# Patient Record
Sex: FEMALE | Race: BLACK OR AFRICAN AMERICAN | Employment: UNEMPLOYED | URBAN - METROPOLITAN AREA
[De-identification: names, ages, dates, MRNs, and addresses within clinical notes are randomized per-mention and may not be internally consistent; named-entity substitution may affect disease eponyms.]

---

## 2019-04-18 ENCOUNTER — HOSPITAL ENCOUNTER (EMERGENCY)
Age: 29
Discharge: HOME | End: 2019-04-18
Attending: EMERGENCY MEDICINE
Payer: MEDICAID

## 2019-04-18 VITALS
HEIGHT: 67 IN | HEART RATE: 81 BPM | RESPIRATION RATE: 16 BRPM | SYSTOLIC BLOOD PRESSURE: 117 MMHG | TEMPERATURE: 97.9 F | BODY MASS INDEX: 30.61 KG/M2 | OXYGEN SATURATION: 100 % | WEIGHT: 195 LBS | DIASTOLIC BLOOD PRESSURE: 73 MMHG

## 2019-04-18 DIAGNOSIS — F32.A DEPRESSION, UNSPECIFIED DEPRESSION TYPE: Primary | ICD-10-CM

## 2019-04-18 LAB
-: ABNORMAL
ABSOLUTE EOS #: 0 K/UL (ref 0–0.4)
ABSOLUTE IMMATURE GRANULOCYTE: ABNORMAL K/UL (ref 0–0.3)
ABSOLUTE LYMPH #: 2 K/UL (ref 1–4.8)
ABSOLUTE MONO #: 1.1 K/UL (ref 0.1–1.2)
ALBUMIN SERPL-MCNC: 4.4 G/DL (ref 3.5–5.2)
ALBUMIN/GLOBULIN RATIO: 1.2 (ref 1–2.5)
ALP BLD-CCNC: 50 U/L (ref 35–104)
ALT SERPL-CCNC: 9 U/L (ref 5–33)
AMORPHOUS: ABNORMAL
AMPHETAMINE SCREEN URINE: NEGATIVE
ANION GAP SERPL CALCULATED.3IONS-SCNC: 15 MMOL/L (ref 9–17)
AST SERPL-CCNC: 19 U/L
BACTERIA: ABNORMAL
BARBITURATE SCREEN URINE: NEGATIVE
BASOPHILS # BLD: 0 % (ref 0–2)
BASOPHILS ABSOLUTE: 0 K/UL (ref 0–0.2)
BENZODIAZEPINE SCREEN, URINE: NEGATIVE
BILIRUB SERPL-MCNC: 0.6 MG/DL (ref 0.3–1.2)
BILIRUBIN URINE: ABNORMAL
BUN BLDV-MCNC: 15 MG/DL (ref 6–20)
BUN/CREAT BLD: ABNORMAL (ref 9–20)
BUPRENORPHINE URINE: ABNORMAL
CALCIUM SERPL-MCNC: 9.2 MG/DL (ref 8.6–10.4)
CANNABINOID SCREEN URINE: POSITIVE
CASTS UA: ABNORMAL /LPF
CHLORIDE BLD-SCNC: 104 MMOL/L (ref 98–107)
CO2: 17 MMOL/L (ref 20–31)
COCAINE METABOLITE, URINE: NEGATIVE
COLOR: YELLOW
COMMENT UA: ABNORMAL
CREAT SERPL-MCNC: 0.58 MG/DL (ref 0.5–0.9)
CRYSTALS, UA: ABNORMAL /HPF
DIFFERENTIAL TYPE: ABNORMAL
EOSINOPHILS RELATIVE PERCENT: 0 % (ref 1–4)
EPITHELIAL CELLS UA: ABNORMAL /HPF (ref 0–5)
GFR AFRICAN AMERICAN: >60 ML/MIN
GFR NON-AFRICAN AMERICAN: >60 ML/MIN
GFR SERPL CREATININE-BSD FRML MDRD: ABNORMAL ML/MIN/{1.73_M2}
GFR SERPL CREATININE-BSD FRML MDRD: ABNORMAL ML/MIN/{1.73_M2}
GLUCOSE BLD-MCNC: 89 MG/DL (ref 70–99)
GLUCOSE URINE: NEGATIVE
HCG(URINE) PREGNANCY TEST: NEGATIVE
HCT VFR BLD CALC: 38.5 % (ref 36–46)
HEMOGLOBIN: 12.7 G/DL (ref 12–16)
IMMATURE GRANULOCYTES: ABNORMAL %
KETONES, URINE: ABNORMAL
LEUKOCYTE ESTERASE, URINE: NEGATIVE
LYMPHOCYTES # BLD: 15 % (ref 24–44)
MCH RBC QN AUTO: 29.8 PG (ref 26–34)
MCHC RBC AUTO-ENTMCNC: 33.1 G/DL (ref 31–37)
MCV RBC AUTO: 89.9 FL (ref 80–100)
MDMA URINE: ABNORMAL
METHADONE SCREEN, URINE: NEGATIVE
METHAMPHETAMINE, URINE: ABNORMAL
MONOCYTES # BLD: 8 % (ref 2–11)
MUCUS: ABNORMAL
NITRITE, URINE: NEGATIVE
NRBC AUTOMATED: ABNORMAL PER 100 WBC
OPIATES, URINE: NEGATIVE
OTHER OBSERVATIONS UA: ABNORMAL
OXYCODONE SCREEN URINE: NEGATIVE
PDW BLD-RTO: 14.2 % (ref 12.5–15.4)
PH UA: 6 (ref 5–8)
PHENCYCLIDINE, URINE: NEGATIVE
PLATELET # BLD: 251 K/UL (ref 140–450)
PLATELET ESTIMATE: ABNORMAL
PMV BLD AUTO: 8.3 FL (ref 6–12)
POTASSIUM SERPL-SCNC: 4 MMOL/L (ref 3.7–5.3)
PROPOXYPHENE, URINE: ABNORMAL
PROTEIN UA: ABNORMAL
RBC # BLD: 4.28 M/UL (ref 4–5.2)
RBC # BLD: ABNORMAL 10*6/UL
RBC UA: ABNORMAL /HPF (ref 0–2)
RENAL EPITHELIAL, UA: ABNORMAL /HPF
SEG NEUTROPHILS: 77 % (ref 36–66)
SEGMENTED NEUTROPHILS ABSOLUTE COUNT: 10.4 K/UL (ref 1.8–7.7)
SODIUM BLD-SCNC: 136 MMOL/L (ref 135–144)
SPECIFIC GRAVITY UA: 1.03 (ref 1–1.03)
TEST INFORMATION: ABNORMAL
TOTAL PROTEIN: 8.1 G/DL (ref 6.4–8.3)
TRICHOMONAS: ABNORMAL
TRICYCLIC ANTIDEPRESSANTS, UR: ABNORMAL
TSH SERPL DL<=0.05 MIU/L-ACNC: 2.61 MIU/L (ref 0.3–5)
TURBIDITY: ABNORMAL
URINE HGB: NEGATIVE
UROBILINOGEN, URINE: NORMAL
WBC # BLD: 13.6 K/UL (ref 3.5–11)
WBC # BLD: ABNORMAL 10*3/UL
WBC UA: ABNORMAL /HPF (ref 0–5)
YEAST: ABNORMAL

## 2019-04-18 PROCEDURE — 80053 COMPREHEN METABOLIC PANEL: CPT

## 2019-04-18 PROCEDURE — 80307 DRUG TEST PRSMV CHEM ANLYZR: CPT

## 2019-04-18 PROCEDURE — 36415 COLL VENOUS BLD VENIPUNCTURE: CPT

## 2019-04-18 PROCEDURE — 85025 COMPLETE CBC W/AUTO DIFF WBC: CPT

## 2019-04-18 PROCEDURE — 81001 URINALYSIS AUTO W/SCOPE: CPT

## 2019-04-18 PROCEDURE — 99284 EMERGENCY DEPT VISIT MOD MDM: CPT

## 2019-04-18 PROCEDURE — 84703 CHORIONIC GONADOTROPIN ASSAY: CPT

## 2019-04-18 PROCEDURE — 84443 ASSAY THYROID STIM HORMONE: CPT

## 2019-04-18 SDOH — HEALTH STABILITY: MENTAL HEALTH: HOW OFTEN DO YOU HAVE A DRINK CONTAINING ALCOHOL?: NEVER

## 2019-04-18 ASSESSMENT — ENCOUNTER SYMPTOMS
SHORTNESS OF BREATH: 0
COUGH: 0
DIARRHEA: 0
VOMITING: 0
RHINORRHEA: 0
BACK PAIN: 0
ABDOMINAL PAIN: 0
NAUSEA: 0
EYE PAIN: 0
SORE THROAT: 0

## 2019-04-18 NOTE — ED NOTES
Pt placed in paper gown, all personal belongings/ jewelry collected, placed at nurses station. Pt in view of nurse for constant supervision. Portable computer, vital sign machine and Alaris removed from room.       Fuad President, JENNY  04/18/19 4890

## 2019-04-18 NOTE — ED NOTES
Pt sitting up in bad, asking to talk. Pt talking about her feelings of \"trying to find a purpose but nothing ever works out. \" States she feels she is \"living on a set and nothing is reality. \" Pt initally denies hallucinations, but then pt states she started to think about question and reflect, and states she was \"seeing angles, other creatures and aliens. \" Pt does reports she is close to her brother who lives in [place], states he has bipoloar, and talking with him helps her reflect about there own feelings. Pt tearful, emotional support provided. Verbalizes understanding that screener will come to ER to determine plan and provide resources.       Leighann Sutherland RN  04/18/19 9443

## 2019-04-18 NOTE — ED PROVIDER NOTES
[facility]  [address] [address]  Phone: [phone]  Fax: [phone]    eMERGENCY dEPARTMENT eNCOUnter          Pt Name: Rainer Acosta  MRN: [id number]  Macgfkevin 1990  Date of evaluation: 4/18/2019      CHIEF COMPLAINT       Chief Complaint   Patient presents with    Psychiatric Evaluation     reports new diagnosis of Schizoprenia in Dec, not on meds, reports paranoid and \"hyperspirituality\"        HISTORY OF PRESENT ILLNESS              Rainer Acosta is a 33 y.o. female who presents   requesting help for her psychiatric condition. States December of last year she was diagnosed with schizophrenia. She was started on medications and had outpatient therapy. This was all done in [place]. She has since moved here. Has not established any care. Has not taken the medications since December when she stopped them due to side effects. Had not tried any other medications. States the past few days she has had some thoughts of hurting herself but has not acted on any of those thoughts and has no intentions of doing so. Has also been \"hyper spiritual\" the past few days. States yesterday she smoked marijuana that made her paranoid but did not help any of her symptoms. Denies any other drugs or alcohol. Denies other medications. States she is not pregnant. Denies any physical pain. No homicidal thoughts. Denies any fevers or recent illness. She does report some paranoid thoughts yesterday. Denies other symptoms. REVIEW OF SYSTEMS         Review of Systems   Constitutional: Negative for chills, fatigue and fever. HENT: Negative for rhinorrhea and sore throat. Eyes: Negative for pain. Respiratory: Negative for cough and shortness of breath. Cardiovascular: Negative for chest pain. Gastrointestinal: Negative for abdominal pain, diarrhea, nausea and vomiting. Genitourinary: Negative for difficulty urinating. Musculoskeletal: Negative for back pain and neck pain. Skin: Negative for rash. Neurological: Negative for weakness and headaches. Psychiatric/Behavioral: Positive for decreased concentration, dysphoric mood and suicidal ideas. Negative for agitation, behavioral problems, confusion and self-injury. All other systems reviewed and are negative. PAST MEDICAL HISTORY    has a past medical history of Schizophrenia ([facility] [address] SURGICAL HISTORY      has no past surgical history on file. CURRENT MEDICATIONS       There are no discharge medications for this patient. ALLERGIES     has No Known Allergies. FAMILY HISTORY     has no family status information on file. family history is not on file. SOCIAL HISTORY      reports that she has never smoked. She has never used smokeless tobacco. She reports that she has current or past drug history. Drug: Marijuana. She reports that she does not drink alcohol. PHYSICAL EXAM     INITIAL VITALS:  height is 5' 7\" (1.702 m) and weight is 195 lb (88.5 kg). Her oral temperature is 97.9 °F (36.6 °C). Her blood pressure is 117/73 and her pulse is 81. Her respiration is 16 and oxygen saturation is 100%. Physical Exam   Constitutional: She appears well-developed and well-nourished. Non-toxic appearance. She does not appear ill. No distress. HENT:   Head: Normocephalic and atraumatic. Right Ear: External ear normal.   Left Ear: External ear normal.   Eyes: EOM and lids are normal.   Neck: No tracheal deviation present. Cardiovascular: Normal rate and regular rhythm. Pulmonary/Chest: Effort normal and breath sounds normal. No respiratory distress. Abdominal: Soft. There is no tenderness. Neurological: She is alert. GCS eye subscore is 4. GCS verbal subscore is 5. GCS motor subscore is 6. Skin: Skin is warm and dry. Psychiatric: She has a normal mood and affect. Her speech is normal and behavior is normal. Her mood appears not anxious. Thought content is paranoid. She expresses suicidal ideation. She expresses no homicidal ideation. She expresses no suicidal plans and no homicidal plans. Vitals reviewed. DIFFERENTIAL DIAGNOSIS/ MDM:     This time will be to check baseline labs and called the [facility] screen are for further evaluation. DIAGNOSTIC RESULTS     EKG: All EKG's are interpreted by the Emergency Department Physician who either signs or Co-signs this chart in the absence of a cardiologist.        RADIOLOGY:   I directly visualized the following  images and reviewed the radiologist interpretations:  No orders to display       No results found.     LABS:  Results for orders placed or performed during the hospital encounter of 04/18/19   CBC Auto Differential   Result Value Ref Range    WBC 13.6 (H) 3.5 - 11.0 k/uL    RBC 4.28 4.0 - 5.2 m/uL    Hemoglobin 12.7 12.0 - 16.0 g/dL    Hematocrit 38.5 36 - 46 %    MCV 89.9 80 - 100 fL    MCH 29.8 26 - 34 pg    MCHC 33.1 31 - 37 g/dL    RDW 14.2 12.5 - 15.4 %    Platelets 489 150 - 422 k/uL    MPV 8.3 6.0 - 12.0 fL    NRBC Automated NOT REPORTED per 100 WBC    Differential Type NOT REPORTED     Seg Neutrophils 77 (H) 36 - 66 %    Lymphocytes 15 (L) 24 - 44 %    Monocytes 8 2 - 11 %    Eosinophils % 0 (L) 1 - 4 %    Basophils 0 0 - 2 %    Immature Granulocytes NOT REPORTED 0 %    Segs Absolute 10.40 (H) 1.8 - 7.7 k/uL    Absolute Lymph # 2.00 1.0 - 4.8 k/uL    Absolute Mono # 1.10 0.1 - 1.2 k/uL    Absolute Eos # 0.00 0.0 - 0.4 k/uL    Basophils # 0.00 0.0 - 0.2 k/uL    Absolute Immature Granulocyte NOT REPORTED 0.00 - 0.30 k/uL    WBC Morphology NOT REPORTED     RBC Morphology NOT REPORTED     Platelet Estimate NOT REPORTED    Comprehensive Metabolic Panel   Result Value Ref Range    Glucose 89 70 - 99 mg/dL    BUN 15 6 - 20 mg/dL    CREATININE 0.58 0.50 - 0.90 mg/dL    Bun/Cre Ratio NOT REPORTED 9 - 20    Calcium 9.2 8.6 - 10.4 mg/dL    Sodium 136 135 - 144 mmol/L Potassium 4.0 3.7 - 5.3 mmol/L    Chloride 104 98 - 107 mmol/L    CO2 17 (L) 20 - 31 mmol/L    Anion Gap 15 9 - 17 mmol/L    Alkaline Phosphatase 50 35 - 104 U/L    ALT 9 5 - 33 U/L    AST 19 <32 U/L    Total Bilirubin 0.60 0.3 - 1.2 mg/dL    Total Protein 8.1 6.4 - 8.3 g/dL    Alb 4.4 3.5 - 5.2 g/dL    Albumin/Globulin Ratio 1.2 1.0 - 2.5    GFR Non-African American >60 >60 mL/min    GFR African American >60 >60 mL/min    GFR Comment          GFR Staging NOT REPORTED    Urine Drug Screen   Result Value Ref Range    Amphetamine Screen, Ur NEGATIVE NEGATIVE    Barbiturate Screen, Ur NEGATIVE NEGATIVE    Benzodiazepine Screen, Urine NEGATIVE NEGATIVE    Cocaine Metabolite, Urine NEGATIVE NEGATIVE    Methadone Screen, Urine NEGATIVE NEGATIVE    Opiates, Urine NEGATIVE NEGATIVE    Phencyclidine, Urine NEGATIVE NEGATIVE    Propoxyphene, Urine NOT REPORTED NEGATIVE    Cannabinoid Scrn, Ur POSITIVE (A) NEGATIVE    Oxycodone Screen, Ur NEGATIVE NEGATIVE    Methamphetamine, Urine NOT REPORTED NEGATIVE    Tricyclic Antidepressants, Urine NOT REPORTED NEGATIVE    MDMA, Urine NOT REPORTED NEGATIVE    Buprenorphine Urine NOT REPORTED NEGATIVE    Test Information       Assay provides medical screening only. The absence of expected drug(s) and/or metabolite(s) may indicate diluted or adulterated urine, limitations of testing or timing of collection.    Pregnancy, Urine   Result Value Ref Range    HCG(Urine) Pregnancy Test NEGATIVE NEGATIVE   TSH without Reflex   Result Value Ref Range    TSH 2.61 0.30 - 5.00 mIU/L   Urinalysis Reflex to Culture   Result Value Ref Range    Color, UA YELLOW YELLOW    Turbidity UA SLIGHTLY CLOUDY (A) CLEAR    Glucose, Ur NEGATIVE NEGATIVE    Bilirubin Urine NEGATIVE  Verified by ictotest. (A) NEGATIVE    Ketones, Urine LARGE (A) NEGATIVE    Specific Gravity, UA 1.028 1.005 - 1.030    Urine Hgb NEGATIVE NEGATIVE    pH, UA 6.0 5.0 - 8.0    Protein, UA TRACE (A) NEGATIVE    Urobilinogen, Urine Normal program.  Efforts were made to edit the dictations but occasionally words are mis-transcribed.)    Mejia Cedeno DO  Attending Emergency Physician        Mejia Cedeno DO  04/19/19 9808

## 2019-04-18 NOTE — ED NOTES
[facility] contacted for pt evaluation, states screener will be at facility within the hour.       Mary Barakat RN  04/18/19 9895

## 2019-04-18 NOTE — ED PROVIDER NOTES
ADDENDUM:        The patient was seen for Psychiatric Evaluation (reports new diagnosis of Schizoprenia in Dec, not on meds, reports paranoid and \"hyperspirituality\" )  . The patient's initial evaluation and plan have been discussed with the prior provider who initially evaluated the patient. Nursing Notes, Past Medical Hx, Past Surgical Hx, Social Hx, Allergies, and Family Hx were all reviewed.       Diagnostic Results     EKG         LABS:   Results for orders placed or performed during the hospital encounter of 04/18/19   CBC Auto Differential   Result Value Ref Range    WBC 13.6 (H) 3.5 - 11.0 k/uL    RBC 4.28 4.0 - 5.2 m/uL    Hemoglobin 12.7 12.0 - 16.0 g/dL    Hematocrit 38.5 36 - 46 %    MCV 89.9 80 - 100 fL    MCH 29.8 26 - 34 pg    MCHC 33.1 31 - 37 g/dL    RDW 14.2 12.5 - 15.4 %    Platelets 275 381 - 715 k/uL    MPV 8.3 6.0 - 12.0 fL    NRBC Automated NOT REPORTED per 100 WBC    Differential Type NOT REPORTED     Seg Neutrophils 77 (H) 36 - 66 %    Lymphocytes 15 (L) 24 - 44 %    Monocytes 8 2 - 11 %    Eosinophils % 0 (L) 1 - 4 %    Basophils 0 0 - 2 %    Immature Granulocytes NOT REPORTED 0 %    Segs Absolute 10.40 (H) 1.8 - 7.7 k/uL    Absolute Lymph # 2.00 1.0 - 4.8 k/uL    Absolute Mono # 1.10 0.1 - 1.2 k/uL    Absolute Eos # 0.00 0.0 - 0.4 k/uL    Basophils # 0.00 0.0 - 0.2 k/uL    Absolute Immature Granulocyte NOT REPORTED 0.00 - 0.30 k/uL    WBC Morphology NOT REPORTED     RBC Morphology NOT REPORTED     Platelet Estimate NOT REPORTED    Comprehensive Metabolic Panel   Result Value Ref Range    Glucose 89 70 - 99 mg/dL    BUN 15 6 - 20 mg/dL    CREATININE 0.58 0.50 - 0.90 mg/dL    Bun/Cre Ratio NOT REPORTED 9 - 20    Calcium 9.2 8.6 - 10.4 mg/dL    Sodium 136 135 - 144 mmol/L    Potassium 4.0 3.7 - 5.3 mmol/L    Chloride 104 98 - 107 mmol/L    CO2 17 (L) 20 - 31 mmol/L    Anion Gap 15 9 - 17 mmol/L    Alkaline Phosphatase 50 35 - 104 U/L    ALT 9 5 - 33 U/L    AST 19 <32 U/L    Total this note were completed with a voice recognition program.  Efforts were made to edit the dictations but occasionally words are mis-transcribed.)    Racquel Miller D.O.          Racqule Duty, DO  04/18/19 2228

## 2019-04-18 NOTE — ED NOTES
Cab arrives to transport pt to [facility] in [facility]. Pt cleared for discharge per MD. Pt discharge instructions explained, No Rx Given. Pt Verbalized understanding of all instructions and all patient questions answered to their satisfaction. Pt departs from ED in stable condition.         Alise Escobar RN  04/18/19 1603

## 2019-04-18 NOTE — ED NOTES
Pt to ER by self, ambulated to room, steady gait. Pt reports she desires a psych evaluation. Pt goes on to reports new diagnosis of schizophrenia in December, diagnosed in [place]. Pt reports she was set up with out patient therapy and meds prescribed, but pt moved to [address] shortly after, stopped meds after about one week, states she did not like \"how they made her feel. \" Pt has not had any psych treatment since December. Pt reports she is homeless, staying with family and friends, but states current house is \"dysfunctional environment,\" and she does not want to go back to family that she is living with now in [facility] [place] [facility]\"", she is hoping for contact information about shelters. Pt reports she has thoughts of harming herself \"mentally,\" states \"there is no purpose or point to live with these thoughts. \" Pt denies any plans or intent to act on thoughts. Pt denies wanting to harm others. Pt reports feeling paranoid, and \"hyperspirituality,\" that started yesterday. Pt states she has been walking since 8pm last night, waiting \"for a sign,\" to guide her. Pt denies pain, denies analgesics PTA, denies alcohol or illegal drug consumption, does admit to [facility] use tonight.  Pt calm, cooperative, respers even non labored, skin warm pink, no distress, here for eval.        Ana Neville RN  04/18/19 5446

## 2020-01-10 ENCOUNTER — HOSPITAL ENCOUNTER (INPATIENT)
Age: 30
LOS: 10 days | Discharge: OTHER INSTITUTION | DRG: 751 | End: 2020-01-20
Attending: PSYCHIATRY & NEUROLOGY | Admitting: PSYCHIATRY & NEUROLOGY
Payer: COMMERCIAL

## 2020-01-10 PROBLEM — F29 PSYCHOSIS (HCC): Status: ACTIVE | Noted: 2020-01-10

## 2020-01-10 PROCEDURE — 1240000000 HC EMOTIONAL WELLNESS R&B

## 2020-01-10 PROCEDURE — 6360000002 HC RX W HCPCS: Performed by: PSYCHIATRY & NEUROLOGY

## 2020-01-10 RX ORDER — TRAZODONE HYDROCHLORIDE 50 MG/1
50 TABLET ORAL NIGHTLY PRN
Status: DISCONTINUED | OUTPATIENT
Start: 2020-01-10 | End: 2020-01-20 | Stop reason: HOSPADM

## 2020-01-10 RX ORDER — LORAZEPAM 2 MG/ML
1 INJECTION INTRAMUSCULAR EVERY 8 HOURS PRN
Status: DISCONTINUED | OUTPATIENT
Start: 2020-01-10 | End: 2020-01-20 | Stop reason: HOSPADM

## 2020-01-10 RX ORDER — HALOPERIDOL 5 MG/ML
10 INJECTION INTRAMUSCULAR EVERY 8 HOURS PRN
Status: DISCONTINUED | OUTPATIENT
Start: 2020-01-10 | End: 2020-01-20 | Stop reason: HOSPADM

## 2020-01-10 RX ORDER — HYDROXYZINE HYDROCHLORIDE 25 MG/1
25 TABLET, FILM COATED ORAL 3 TIMES DAILY PRN
Status: DISCONTINUED | OUTPATIENT
Start: 2020-01-10 | End: 2020-01-20 | Stop reason: HOSPADM

## 2020-01-10 RX ORDER — MAGNESIUM HYDROXIDE/ALUMINUM HYDROXICE/SIMETHICONE 120; 1200; 1200 MG/30ML; MG/30ML; MG/30ML
30 SUSPENSION ORAL 3 TIMES DAILY PRN
Status: DISCONTINUED | OUTPATIENT
Start: 2020-01-10 | End: 2020-01-20 | Stop reason: HOSPADM

## 2020-01-10 RX ORDER — ACETAMINOPHEN 325 MG/1
650 TABLET ORAL EVERY 4 HOURS PRN
Status: DISCONTINUED | OUTPATIENT
Start: 2020-01-10 | End: 2020-01-20 | Stop reason: HOSPADM

## 2020-01-10 RX ORDER — DIPHENHYDRAMINE HYDROCHLORIDE 50 MG/ML
25 INJECTION INTRAMUSCULAR; INTRAVENOUS EVERY 8 HOURS PRN
Status: DISCONTINUED | OUTPATIENT
Start: 2020-01-10 | End: 2020-01-20 | Stop reason: HOSPADM

## 2020-01-10 RX ORDER — BENZTROPINE MESYLATE 1 MG/ML
2 INJECTION INTRAMUSCULAR; INTRAVENOUS DAILY PRN
Status: DISCONTINUED | OUTPATIENT
Start: 2020-01-10 | End: 2020-01-20 | Stop reason: HOSPADM

## 2020-01-10 RX ADMIN — LORAZEPAM 1 MG: 2 INJECTION INTRAMUSCULAR; INTRAVENOUS at 14:39

## 2020-01-10 RX ADMIN — HALOPERIDOL LACTATE 10 MG: 5 INJECTION, SOLUTION INTRAMUSCULAR at 14:38

## 2020-01-10 RX ADMIN — DIPHENHYDRAMINE HYDROCHLORIDE 25 MG: 50 INJECTION, SOLUTION INTRAMUSCULAR; INTRAVENOUS at 14:39

## 2020-01-10 ASSESSMENT — SLEEP AND FATIGUE QUESTIONNAIRES
RESTFUL SLEEP: YES
SLEEP PATTERN: DIFFICULTY FALLING ASLEEP
DO YOU HAVE DIFFICULTY SLEEPING: YES
DO YOU USE A SLEEP AID: NO
DIFFICULTY FALLING ASLEEP: YES
DIFFICULTY STAYING ASLEEP: YES
DIFFICULTY ARISING: NO
AVERAGE NUMBER OF SLEEP HOURS: 3

## 2020-01-10 ASSESSMENT — PAIN - FUNCTIONAL ASSESSMENT: PAIN_FUNCTIONAL_ASSESSMENT: 0-10

## 2020-01-10 ASSESSMENT — LIFESTYLE VARIABLES: HISTORY_ALCOHOL_USE: NO

## 2020-01-10 NOTE — GROUP NOTE
Group Therapy Note    Date: 1/10/2020    Group Start Time: 1330  Group End Time: 8136  Group Topic: Psychoeducation    JULES Greenfield, CTRS    Patient refused to attend Recreational Therapy Group at 1330 due to still going through admission process on unit. 1:1 not able to be offered due to patient having CIT on unit and receiving multiple PRNs.      Signature:  Giuliana Gongora
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
## 2020-01-10 NOTE — BH NOTE
BEHAVIORAL SERVICES: One - Hour In- Person Review  For Management of Violent or Self - Destructive Behavior  Medication assist - physical hold needed    Reason for Intervention: patient combative with staff and peers    Response to Intervention:  Patient continuing to yell  In room.     Medical Record reviewed and discussed precipitating events/behaviors with RN initiating Intervention:  {YES     Patient Medical Status:  Vital Signs: refused   Respiratory Status: WNL  Circulatory Status: WNL  Skin Integrity:intact    Orientation:  x4    Mood/Affect: labile    Speech: loud and pressured    Thought Content: delusions     Thought Processes: Flight of Ideas and Loose Associations    Rationale for continued use of intervention:  1 minute hold to administer medication    Rationale for discontinuing intervention: Patient is able to: maintain control    One Hour Review Evaluation Physician Notification:  yes

## 2020-01-10 NOTE — GROUP NOTE
Group Therapy Note    Date: 1/10/2020    Group Start Time: 9006  Group End Time: 7928  Group Topic: Psychoeducation    CARROLL YousifS    Patient was not able to attend Psychoeducation Group. Patient received PRN medications prior to group and was resting in room.      Signature:  Yulisa Ayers

## 2020-01-11 PROBLEM — F43.10 PTSD (POST-TRAUMATIC STRESS DISORDER): Status: ACTIVE | Noted: 2020-01-11

## 2020-01-11 PROCEDURE — 1240000000 HC EMOTIONAL WELLNESS R&B

## 2020-01-11 ASSESSMENT — ENCOUNTER SYMPTOMS
SHORTNESS OF BREATH: 0
TROUBLE SWALLOWING: 0
NAUSEA: 0
RHINORRHEA: 0
CONSTIPATION: 0
WHEEZING: 0
VOMITING: 0
DIARRHEA: 0
COUGH: 0
ABDOMINAL PAIN: 0

## 2020-01-11 ASSESSMENT — LIFESTYLE VARIABLES: HISTORY_ALCOHOL_USE: NO

## 2020-01-11 NOTE — GROUP NOTE
Group Therapy Note  Date: 1/11/2020   Group Start Time: 1330  Group End Time: 2711  Group Topic: Cognitive Skills Group-Name That Tune  Attendees: 6/16  Unit: [facility]  Goal: To increase patient's cognitive function by the use of music trivia by the end of group. Notes: Carissa Mercado was a pleasant participant in group.    Status After Intervention: Improved  Participation Level: Interactive, Active Listener   Participation Quality: Appropriate and Attentive  Speech: Normal  Thought Process/Content: Logical  Affective Functioning: Congruent  Mood: Euthymic  Level of consciousness: Alert, Oriented x4 and Attentive  Response to Learning: Able to verbalize current knowledge/experience, Able to verbalize/acknowledge new learning and Able to retain information  Endings: None Reported  Modes of Intervention: Socialization and Activity  Discipline Responsible: Psychoeducational Specialist  Signature: Bradley Francois

## 2020-01-11 NOTE — PLAN OF CARE
Toward Treatment Goals: reviewed group plans and strategies for care    Method:group therapy, medication compliance, individualized assessments and care planning    Outcome: needs reinforcement    PATIENT GOALS: to be discussed with patient within 72 hours    PLAN/TREATMENT RECOMMENDATIONS:     continue group therapy , medications compliance, goal setting, individualized assessments and care, continue to monitor pt on unit      SHORT-TERM GOALS:   Time frame for Short-Term Goals: 5-7 days    LONG-TERM GOALS:  Time frame for Long-Term Goals: 6 months  Members Present in Team Meeting: See Signature Sheet    Annie Snyder

## 2020-01-11 NOTE — GROUP NOTE
Group Therapy Note    Date: 1/11/2020    Group Start Time: 1600  Group End Time: 1640  Group Topic: Healthy Living/Wellness    JULES COX    Reina Travis LPN        Group Therapy Note    Attendees: 8/17         Patient's Goal:  Socialization education    Notes:      Status After Intervention:  Unchanged    Participation Level: Interactive    Participation Quality: Appropriate      Speech:  normal      Thought Process/Content: Logical      Affective Functioning: Congruent      Mood: stable      Level of consciousness:  Alert      Response to Learning: Able to verbalize current knowledge/experience      Endings: None Reported    Modes of Intervention: Education      Discipline Responsible: Licensed Practical Nurse      Signature:  Reina Travis LPN

## 2020-01-11 NOTE — GROUP NOTE
Group Therapy Note  Date: 1/11/2020   Group Start Time: 0900  Group End Time: 0930  Group Topic: [employer] & Trivia  Attendees: 6/18  Unit: [facility]  Goal: To provide a goal to improve his/her mental health that is obtainable by 8:00PM this evening. To increase cognitive function by the use of trivia. Notes: Chloe's goal for the day is to get some sleep.    Status After Intervention: Improved  Participation Level: Interactive, Active Listener   Participation Quality: Appropriate, Attentive, Sharing, Supportive  Speech: Normal  Thought Process/Content: Logical  Affective Functioning: Congruent  Mood: Euthymic  Level of consciousness: Alert, Oriented x4 and Attentive  Response to Learning: Able to verbalize current knowledge/experience, Able to verbalize/acknowledge new learning and Able to retain information  Endings: None Reported  Modes of Intervention: Education, Support, Socialization, Exploration, and Activity  Discipline Responsible: Psychoeducational Specialist  Signature: Kathy Tomas
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
## 2020-01-11 NOTE — H&P
HISTORY and [id number]       NAME:  Jesus Aguilar  MRN: [id number]   YOB: 1990   Date: 1/11/2020   Age: 30 y.o. Gender: female     COMPLAINT AND PRESENT HISTORY:      Jesus Aguilar is 30 y.o.,African American  female, admitted because of psychosis. [facility], she was reportedly brought by the women's shelter she was staying at due to [place]. \" Pt is flat and evasive with examiner. She did allow limited physical examination. Pt states she received a PRN injection yesterday and \"I did not approve that. \" Advised to discuss her medications and treatment during inpatient with her psychiatric practitioner, as writer does the H&Ps only, pt verbalized understanding. Pt reports \"I'm currently unhoused. \"  It is unclear if she is been compliant with medications. Patient denies suicidal or homicidal ideation. She denies auditory tactile or visual hallucinations. She reports poor sleep and appetite. She denies smoking cigarettes. Denies alcohol or drug abuse. She reports she is otherwise healthy other than history of schizophrenia. Poor insight. See psychiatric admission note for further psychiatric history. Per chart notes, pt came in with abdominal pain but she denies this currently. DIAGNOSTIC RESULTS   Labs: pending. PAST MEDICAL HISTORY     Past Medical History:   Diagnosis Date    Schizophrenia [facility])     reports recent diagnosis in Dec 2018, not on meds currently        SURGICAL HISTORY     No past surgical history on file.     FAMILY HISTORY       Family History   Family history unknown: Yes       SOCIAL HISTORY       Social History     Socioeconomic History    Marital status: Single     Spouse name: Not on file    Number of children: Not on file    Years of education: Not on file    Highest education level: Not on file   Occupational History    Not on file   Social Needs    Financial resource for agitation, decreased concentration, dysphoric mood and sleep disturbance. Negative for hallucinations and suicidal ideas (she denies). The patient is nervous/anxious. GENERAL PHYSICAL EXAM:     Vitals: /75   Pulse 109   Temp 98.6 °F (37 °C)   Resp 14   Ht 5' 6\" (1.676 m)   Wt 172 lb (78 kg)   BMI 27.76 kg/m²  Body mass index is 27.76 kg/m². Pt was examined with a nurse present in the room. GENERAL APPEARANCE:  She Mcmahon is 30 y.o.,  female, mildly obese, nourished, conscious, alert. Does not appear to be distress or pain at this time. SKIN:  Warm, dry, no cyanosis or jaundice. HEAD:  Normocephalic, atraumatic, no swelling or tenderness. EYES:  Wears glasses. Pupils equal, reactive to light, Conjunctiva is clear, EOMs intact donald. eyelids WNL. EARS:  No discharge, no marked hearing loss. NOSE:  No rhinorrhea, epistaxis or septal deformity. THROAT:  Uvula midline. No ulceration bleeding or discharge. NECK:  No stiffness, trachea central.    CHEST:  Symmetrical and equal on expansion. HEART:  Tachycardic rate and reg rhythm. S1 > S2, No audible murmurs or gallops. LUNGS:  Equal on expansion, normal breath sounds. ABDOMEN:  Soft on palpation. No localized tenderness. No guarding or rigidity. No palpable organomegaly. LYMPHATICS:  ROSHAN. LOCOMOTOR, BACK AND SPINE:  No tenderness. EXTREMITIES:  Symmetrical, no pedal edema. No calf tenderness. Moves all extremities with no difficulty. NEUROLOGIC:  The patient is conscious, alert, oriented, Gait WNL. No apparent focal sensory deficits. No motor deficits, muscle strength equal Donald. No facial droop, tongue protrudes centrally, no slurring of the speech. Cranial nerves 3-12 reveal no deficits, taste and smell not assessed. PROVISIONAL DIAGNOSES:      Active Problems:    Psychosis ([address]  Resolved Problems:    * No resolved hospital problems.

## 2020-01-11 NOTE — BH NOTE
Patient refused vitals, giving urine sample, blood work, and interview at this time, stating \"not until my advocate gets hear. \"
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
## 2020-01-12 PROCEDURE — 1240000000 HC EMOTIONAL WELLNESS R&B

## 2020-01-12 NOTE — PLAN OF CARE
Problem: Depressive Behavior With or Without Suicide Precautions:  Goal: Able to verbalize acceptance of life and situations over which he or she has no control  Description  Able to verbalize acceptance of life and situations over which he or she has no control  1/11/2020 2017 by [address]  Outcome: Ongoing  1/11/2020 1201 by Amita Obrien LPN  Outcome: Ongoing     Problem: Altered Mood, Deterioration in Function:  Goal: Able to verbalize reality based thinking  Description  Able to verbalize reality based thinking  1/11/2020 2017 by [address]  Outcome: Ongoing  1/11/2020 1201 by Amita Obrien LPN  Outcome: Ongoing   Patient has been seclusive to her room much of the shift. She relates that she has some anxiety because of her \"life situation\" but is vague and nonrelating of details.  She didn't attend group but has been cooperative with interventions

## 2020-01-12 NOTE — PLAN OF CARE
Problem: Depressive Behavior With or Without Suicide Precautions:  Goal: Able to verbalize acceptance of life and situations over which he or she has no control  Description  Able to verbalize acceptance of life and situations over which he or she has no control  Outcome: Ongoing     Problem: Altered Mood, Deterioration in Function:  Goal: Able to verbalize reality based thinking  Description  Able to verbalize reality based thinking  Outcome: Ongoing   Patient denies suicidal and homicidal ideations and depression and verbalizes if thoughts occur she will seek help from staff. Patient reports sleeping and eating well. Patient performed grooming and ADLs. Patient isolative to room, out for select groups and meals. When out for meals patient is social with select peers. Patient is able to verbalize acceptance of life which she has no control. Patient is focused on belongings but understands that she can not have belonging until discharge. Patient is cooperative and pleasant upon approach. Patient is behavioral controlled. Patient safety maintained and 15 minute checks continues.

## 2020-01-12 NOTE — GROUP NOTE
Group Therapy Note  Date: 1/12/2020  Group Start Time: 0900  Group End Time: 2326  Group Topic: Community Meeting & Trivia  Attendees: 10/18   Unit: [facility] Unit A  Goal: To provide a goal to improve his/her mental health that is obtainable by 8:00PM this evening. To increase cognitive function by the use of trivia.   Notes: Chloe's goal for the day is to write/journal.  Status After Intervention: Improved  Participation Level: Interactive, Active Listener   Participation Quality: Appropriate, Attentive, Sharing, Supportive  Speech: Normal  Thought Process/Content: Logical  Affective Functioning: Congruent  Mood: Euthymic  Level of consciousness: Alert, Oriented x4 and Attentive  Response to Learning: Able to verbalize current knowledge/experience, Able to verbalize/acknowledge new learning and Able to retain information  Endings: None Reported  Modes of Intervention: Education, Support, Socialization, Exploration, and Activity  Discipline Responsible: Psychoeducational Specialist  Signature: Laury Ramírez, [address]
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
## 2020-01-12 NOTE — GROUP NOTE
Group Therapy Note    Date: 1/12/2020    Group Start Time: 1100  Group End Time: 9778  Group Topic: Healthy Living/Wellness    JULES Elizabeth RN        Group Therapy Note    Attendees: 11/18         Patient's Goal:  To learn about cognitive behavioral therapy    Notes:  See below    Status After Intervention:  Improved    Participation Level:  Active Listener    Participation Quality: Attentive      Speech:  normal      Thought Process/Content: Logical      Affective Functioning: Congruent      Mood: stable      Level of consciousness:  Oriented x4      Response to Learning: Able to verbalize/acknowledge new learning      Endings: None Reported    Modes of Intervention: Education      Discipline Responsible: Registered Nurse      Signature:  Laureano Elizabeth RN

## 2020-01-12 NOTE — GROUP NOTE
Group Therapy Note    Date: 1/12/2020    Group Start Time: 1000  Group End Time: 3869  Group Topic: Psychoeducation    JULES COX    JUAN MANUEL Conroy        Group Therapy Note    Attendees: 7/18         Patient's Goal:  Pt will demonstrate increased interpersonal interaction and a clear understanding on multiple types of coping skills relating to the here-and-now therapeutic practice. Notes:  Pt actively listened and engaged during group discussion. Status After Intervention:  Improved    Participation Level:  Active Listener and Interactive    Participation Quality: Appropriate, Attentive, Sharing and Supportive      Speech:  normal      Thought Process/Content: Logical  Linear      Affective Functioning: Congruent      Mood: euthymic      Level of consciousness:  Alert, Oriented x4 and Attentive      Response to Learning: Able to verbalize current knowledge/experience, Able to verbalize/acknowledge new learning, Able to retain information, Capable of insight, Able to change behavior and Progressing to goal      Endings: None Reported    Modes of Intervention: Education, Support, Socialization, Clarifying, Problem-solving, Limit-setting and Reality-testing      Discipline Responsible: /Counselor      Signature:  JUAN MANUEL Conroy

## 2020-01-13 LAB
AMPHETAMINE SCREEN URINE: NEGATIVE
BARBITURATE SCREEN URINE: NEGATIVE
BENZODIAZEPINE SCREEN, URINE: NEGATIVE
BILIRUBIN URINE: NEGATIVE
BUPRENORPHINE URINE: NORMAL
CANNABINOID SCREEN URINE: NEGATIVE
COCAINE METABOLITE, URINE: NEGATIVE
COLOR: YELLOW
COMMENT UA: NORMAL
GLUCOSE URINE: NEGATIVE
KETONES, URINE: NEGATIVE
LEUKOCYTE ESTERASE, URINE: NEGATIVE
MDMA URINE: NORMAL
METHADONE SCREEN, URINE: NEGATIVE
METHAMPHETAMINE, URINE: NORMAL
NITRITE, URINE: NEGATIVE
OPIATES, URINE: NEGATIVE
OXYCODONE SCREEN URINE: NEGATIVE
PH UA: 6 (ref 5–8)
PHENCYCLIDINE, URINE: NEGATIVE
PROPOXYPHENE, URINE: NORMAL
PROTEIN UA: NEGATIVE
SPECIFIC GRAVITY UA: 1.03 (ref 1–1.03)
TEST INFORMATION: NORMAL
TRICYCLIC ANTIDEPRESSANTS, UR: NORMAL
TURBIDITY: CLEAR
URINE HGB: NEGATIVE
UROBILINOGEN, URINE: NORMAL

## 2020-01-13 PROCEDURE — 6370000000 HC RX 637 (ALT 250 FOR IP): Performed by: PSYCHIATRY & NEUROLOGY

## 2020-01-13 PROCEDURE — 1240000000 HC EMOTIONAL WELLNESS R&B

## 2020-01-13 PROCEDURE — 81003 URINALYSIS AUTO W/O SCOPE: CPT

## 2020-01-13 PROCEDURE — 80307 DRUG TEST PRSMV CHEM ANLYZR: CPT

## 2020-01-13 RX ADMIN — LURASIDONE HYDROCHLORIDE 20 MG: 40 TABLET, FILM COATED ORAL at 13:34

## 2020-01-13 NOTE — GROUP NOTE
Group Therapy Note    Date: 1/13/2020    Group Start Time: 1000  Group End Time: 0987  Group Topic: Psychotherapy    KELLY [facility] JUAN MANUEL Recinos        Group Therapy Note    Attendees: 4/9         Patient's Goal:  To focus on the here and now with mental health stability. Verbalize acceptance of situations they cannot control. Notes:  Pt stated she was not aware of her reason for being admitted to the [facility]. Expressed readiness to be discharged. Status After Intervention:  Unchanged    Participation Level:  Active Listener and Interactive    Participation Quality: Appropriate, Attentive and Sharing      Speech:  normal      Thought Process/Content: Linear      Affective Functioning: Congruent      Mood: anxious      Level of consciousness:  Alert, Oriented x4 and Attentive      Response to Learning: Progressing to goal      Endings: None Reported    Modes of Intervention: Education, Support, Socialization and Exploration      Discipline Responsible: /Counselor      Signature:  JUAN MANUEL Whitt

## 2020-01-13 NOTE — GROUP NOTE
Group Therapy Note    Date: 1/13/2020    Group Start Time: 1600  Group End Time: 1640  Group Topic: Music Therapy    [facility] [address] [place] [address], RN        Group Therapy Note    Attendees: 8 out of 18         Patient's Goal:  Improvement in mood through music    Notes:  Pt was attentive and appropriate in group    Status After Intervention:  Improved    Participation Level:  Active Listener and Interactive    Participation Quality: Appropriate and Attentive      Speech:  normal      Thought Process/Content: Linear      Affective Functioning: Congruent      Mood: anxious and depressed      Level of consciousness:  Alert, Oriented x4 and Attentive      Response to Learning: Able to verbalize current knowledge/experience      Endings: None Reported    Modes of Intervention: Socialization and Exploration      Discipline Responsible: Registered Nurse      Signature:  Jimbo Peace RN

## 2020-01-13 NOTE — PROGRESS NOTES
PROGRESS NOTE  Chart has been reviewed and the patient was interviewed at the bedside. Patient was a cooperative and pleasant. However, she is still psychotic and refusing to take medications. The patient reported irritability and agitation. She agreed to start on Latuda 20 mg p.o. daily to be titrated as tolerated. The patient reported the problem in falling and staying asleep. The staff reported that the patient had episodes of her control problem and needed to be redirected. Continue to monitoring for symptoms of depression, psychosis and to adjust her medications as needed. MENTAL STATUS EXAMINATION:    /82   Pulse 96   Temp 97.9 °F (36.6 °C) (Oral)   Resp 16   Ht 5' 6\" (1.676 m)   Wt 172 lb (78 kg)   BMI 27.76 kg/m²     MOOD-is dysphoric   Affect-is depressed  Patient feels helpless hopeless and worthless  Psychomotor activity : decreased. APPEARANCE:  Personal hygiene is poor and patient is neglecting his appearance. Patient is somewhat unkempt  PSYCHOSIS:  Denies auditory or visual hallucinations. Denies paranoid delusions. ORIENTATION:  Oriented to time place and person. Recent and remote memory is grossly intact. Intelligence appears to be average  CONCENTRATION:  poor. SPEECH : goal directed , but slow . DIAGNOSIS:    Principal Problem:    Psychosis ([facility] [address] [facility]  Active Problems:    PTSD (post-traumatic stress disorder)  Resolved Problems:    * No resolved hospital problems.  *      LAB:    Recent Results (from the past 72 hour(s))   Urinalysis Reflex to Culture    Collection Time: 01/13/20  1:44 AM   Result Value Ref Range    Color, UA YELLOW YELLOW    Turbidity UA CLEAR CLEAR    Glucose, Ur NEGATIVE NEGATIVE    Bilirubin Urine NEGATIVE NEGATIVE    Ketones, Urine NEGATIVE

## 2020-01-13 NOTE — CARE COORDINATION
[facility] for [address] received call back form [address] MichelEdilberto Russell [phone], who confirmed pt cannot return to shelter upon [facility] discharge. Vannessa Russell was going to call her supervisor to see if there was a possibility that their staff drop the pts items off at the [facility]. SW provided contact information for call back. SHAYAN spoke with ALEX Daniel who stated pt has a CPST needs assessment scheduled for 1/15/20 and has not been open for CM services yet.

## 2020-01-13 NOTE — PLAN OF CARE
Problem: Restraint Use - Nonviolent/Non-Self-Destructive Behavior:  Goal: Absence of restraint indications  Description  Absence of restraint indications, physical hold    1/13/2020 0021 by Maddy Cadena RN  Outcome: Ongoing   Patient has been free of restraint indicators this shift. Will continue to monitor. Problem: Altered Mood, Manic Behavior:  Goal: Able to sleep  Description  Able to sleep  1/13/2020 0021 by Maddy Cadena RN  Outcome: Ongoing   Patient was able to sleep this shift. Will continue to monitor. Problem: Depressive Behavior With or Without Suicide Precautions:  Goal: Able to verbalize acceptance of life and situations over which he or she has no control  Description  Able to verbalize acceptance of life and situations over which he or she has no control  1/13/2020 0021 by Maddy Cadena RN  Outcome: Ongoing    Problem: Altered Mood, Deterioration in Function:  Goal: Able to verbalize reality based thinking  Description  Able to verbalize reality based thinking  1/13/2020 0021 by Maddy Cadena RN  Outcome: Ongoing    Patient reports she is ready to go home and is fine. Patient remains isolative in room and aloof of peers the entire shift. Patient denies suicidal ideation, homicidal ideation, visual hallucinations, and auditory hallucinations. Patient denies depression and anxiety. Patient is accepting of nourishment from staff. Remains behavioral control. Q15 minute checks maintained.

## 2020-01-13 NOTE — CARE COORDINATION
KDOY for [facility] called Ramila to verify if pt can return to shelter, left contact information for manager to call  back.

## 2020-01-14 LAB
DIRECT EXAM: NORMAL
Lab: NORMAL
SPECIMEN DESCRIPTION: NORMAL

## 2020-01-14 PROCEDURE — 1240000000 HC EMOTIONAL WELLNESS R&B

## 2020-01-14 PROCEDURE — 6370000000 HC RX 637 (ALT 250 FOR IP): Performed by: PSYCHIATRY & NEUROLOGY

## 2020-01-14 PROCEDURE — 87804 INFLUENZA ASSAY W/OPTIC: CPT

## 2020-01-14 PROCEDURE — 99222 1ST HOSP IP/OBS MODERATE 55: CPT | Performed by: INTERNAL MEDICINE

## 2020-01-14 PROCEDURE — 6370000000 HC RX 637 (ALT 250 FOR IP): Performed by: INTERNAL MEDICINE

## 2020-01-14 RX ORDER — BENZONATATE 100 MG/1
100 CAPSULE ORAL 3 TIMES DAILY PRN
Status: DISCONTINUED | OUTPATIENT
Start: 2020-01-14 | End: 2020-01-20 | Stop reason: HOSPADM

## 2020-01-14 RX ORDER — GABAPENTIN 100 MG/1
100 CAPSULE ORAL 3 TIMES DAILY
Status: DISCONTINUED | OUTPATIENT
Start: 2020-01-14 | End: 2020-01-14

## 2020-01-14 RX ORDER — LEVOFLOXACIN 500 MG/1
500 TABLET, FILM COATED ORAL DAILY
Status: DISPENSED | OUTPATIENT
Start: 2020-01-14 | End: 2020-01-19

## 2020-01-14 RX ADMIN — HYDROXYZINE HYDROCHLORIDE 25 MG: 25 TABLET, FILM COATED ORAL at 23:17

## 2020-01-14 RX ADMIN — LEVOFLOXACIN 500 MG: 500 TABLET, FILM COATED ORAL at 21:37

## 2020-01-14 RX ADMIN — TRAZODONE HYDROCHLORIDE 50 MG: 50 TABLET ORAL at 20:40

## 2020-01-14 RX ADMIN — LURASIDONE HYDROCHLORIDE 20 MG: 40 TABLET, FILM COATED ORAL at 08:08

## 2020-01-14 RX ADMIN — ACETAMINOPHEN 650 MG: 325 TABLET, FILM COATED ORAL at 09:10

## 2020-01-14 ASSESSMENT — PAIN SCALES - GENERAL
PAINLEVEL_OUTOF10: 3
PAINLEVEL_OUTOF10: 0
PAINLEVEL_OUTOF10: 0

## 2020-01-14 NOTE — BH NOTE
levaquin still not available writer shift started at 3pm reporting nurse states pharmacy was called for medication  This [address] x1 for medication oncoming 7 pm shift notified medication is a daily dose and was not given d/t not being available

## 2020-01-14 NOTE — GROUP NOTE
Group Therapy Note    Date: 1/14/2020    Group Start Time: 1100  Group End Time: 1130  Group Topic: Recreational    STCZ FARA Harrington, CTRS    Group Therapy Note    Attendees: 6    Patient's Goal:  Increase social interaction    Notes:  Pt attended group and participated in activity. Pt interacted with staff and peers. Pt was supportive of peers in group. Status After Intervention:  Improved    Participation Level:  Active Listener and Interactive    Participation Quality: Appropriate, Attentive, Sharing and Supportive    Speech:  normal    Thought Process/Content: Logical    Affective Functioning: Congruent    Mood: euthymic    Level of consciousness:  Alert, Oriented x4 and Attentive    Response to Learning: Able to verbalize current knowledge/experience, Able to verbalize/acknowledge new learning, Able to retain information, Capable of insight and Progressing to goal    Endings: None Reported    Modes of Intervention: Education, Socialization, Exploration, Problem-solving, Activity, Limit-setting and Reality-testing    Discipline Responsible: Psychoeducational Specialist    Signature:  Clint Harrington, [address]

## 2020-01-14 NOTE — GROUP NOTE
Group Therapy Note    Date: 1/14/2020    Group Start Time: 1330  Group End Time: 6528  Group Topic: Psychoeducation    JULES Deleon, CTRS    Patient refused to attend Recreational Therapy Group at 1330 after encouragement from staff. 1:1 talk time offered.     Signature:  Jose David Yadav

## 2020-01-14 NOTE — PLAN OF CARE
Problem: Altered Mood, Manic Behavior:  Goal: Able to sleep  Description  Able to sleep  1/13/2020 2308 by Jonathan Trejo, RN  Outcome: Ongoing   Pt rests in her room quietly this shift  Problem: Depressive Behavior With or Without Suicide Precautions:  Goal: Able to verbalize acceptance of life and situations over which he or she has no control  Description  Able to verbalize acceptance of life and situations over which he or she has no control  1/13/2020 2308 by Jonathan Trejo, RN  Outcome: Ongoing   Pt is seclusive to her room aloof of staff and peers. She does not attend group or eat at snack.

## 2020-01-14 NOTE — PROGRESS NOTES
Urine NEGATIVE NEGATIVE    Ketones, Urine NEGATIVE NEGATIVE    Specific Gravity, UA 1.028 1.000 - 1.030    Urine Hgb NEGATIVE NEGATIVE    pH, UA 6.0 5.0 - 8.0    Protein, UA NEGATIVE NEGATIVE    Urobilinogen, Urine Normal Normal    Nitrite, Urine NEGATIVE NEGATIVE    Leukocyte Esterase, Urine NEGATIVE NEGATIVE    Urinalysis Comments       Microscopic exam not performed based on chemical results unless requested in original order. Urine Drug Screen    Collection Time: 01/13/20  1:45 AM   Result Value Ref Range    Amphetamine Screen, Ur NEGATIVE NEGATIVE    Barbiturate Screen, Ur NEGATIVE NEGATIVE    Benzodiazepine Screen, Urine NEGATIVE NEGATIVE    Cocaine Metabolite, Urine NEGATIVE NEGATIVE    Methadone Screen, Urine NEGATIVE NEGATIVE    Opiates, Urine NEGATIVE NEGATIVE    Phencyclidine, Urine NEGATIVE NEGATIVE    Propoxyphene, Urine NOT REPORTED NEGATIVE    Cannabinoid Scrn, Ur NEGATIVE NEGATIVE    Oxycodone Screen, Ur NEGATIVE NEGATIVE    Methamphetamine, Urine NOT REPORTED NEGATIVE    Tricyclic Antidepressants, Urine NOT REPORTED NEGATIVE    MDMA, Urine NOT REPORTED NEGATIVE    Buprenorphine Urine NOT REPORTED NEGATIVE    Test Information       Assay provides medical screening only. The absence of expected drug(s) and/or metabolite(s) may indicate diluted or adulterated urine, limitations of testing or timing of collection. RAPID INFLUENZA A/B ANTIGENS    Collection Time: 01/14/20  1:45 PM   Result Value Ref Range    Specimen Description . NASOPHARYNGEAL SWAB     Special Requests NOT REPORTED     Direct Exam       PRESUMPTIVE NEGATIVE for Influenza A + B antigens. PCR testing to confirm this result is available upon request.  Specimen will be saved in the laboratory for 7 days. Please call [phone] if PCR testing is indicated.            TREATMENT PLAN:     [START ON 1/15/2020] lurasidone  20 mg Oral Daily    levofloxacin  500 mg Oral Daily     acetaminophen, aluminum &

## 2020-01-14 NOTE — PROGRESS NOTES
Pt did not participate In Wellness at 1600 . Patient stated they were not interested in attending. Staff explained benefits of participation and offered encouragement to attend. Patient was offered 1:1 time which they refused.

## 2020-01-14 NOTE — BH NOTE
Writer spoke with Dr Carisa Hughes at . Dr Carisa Hughes notified of pt's temperature of 100.0, tachycardia with a rate of 110-158 and pt complaint of chest congestion and a cough.  Order for a flu swab obtained

## 2020-01-15 ENCOUNTER — APPOINTMENT (OUTPATIENT)
Dept: GENERAL RADIOLOGY | Age: 30
DRG: 751 | End: 2020-01-15
Attending: PSYCHIATRY & NEUROLOGY
Payer: COMMERCIAL

## 2020-01-15 LAB
ABSOLUTE EOS #: 0.04 K/UL (ref 0–0.4)
ABSOLUTE IMMATURE GRANULOCYTE: ABNORMAL K/UL (ref 0–0.3)
ABSOLUTE LYMPH #: 1.62 K/UL (ref 1–4.8)
ABSOLUTE MONO #: 0.67 K/UL (ref 0.1–1.3)
BASOPHILS # BLD: 0 % (ref 0–2)
BASOPHILS ABSOLUTE: 0 K/UL (ref 0–0.2)
DIFFERENTIAL TYPE: ABNORMAL
EOSINOPHILS RELATIVE PERCENT: 1 % (ref 0–4)
HCT VFR BLD CALC: 38.4 % (ref 36–46)
HEMOGLOBIN: 12.8 G/DL (ref 12–16)
IMMATURE GRANULOCYTES: ABNORMAL %
LYMPHOCYTES # BLD: 44 % (ref 24–44)
MCH RBC QN AUTO: 30.1 PG (ref 26–34)
MCHC RBC AUTO-ENTMCNC: 33.3 G/DL (ref 31–37)
MCV RBC AUTO: 90.4 FL (ref 80–100)
MONOCYTES # BLD: 18 % (ref 1–7)
MORPHOLOGY: NORMAL
NRBC AUTOMATED: ABNORMAL PER 100 WBC
PDW BLD-RTO: 13.4 % (ref 11.5–14.9)
PLATELET # BLD: 194 K/UL (ref 150–450)
PLATELET ESTIMATE: ABNORMAL
PMV BLD AUTO: 8.1 FL (ref 6–12)
RBC # BLD: 4.25 M/UL (ref 4–5.2)
RBC # BLD: ABNORMAL 10*6/UL
SEG NEUTROPHILS: 37 % (ref 36–66)
SEGMENTED NEUTROPHILS ABSOLUTE COUNT: 1.37 K/UL (ref 1.3–9.1)
WBC # BLD: 3.7 K/UL (ref 3.5–11)
WBC # BLD: ABNORMAL 10*3/UL

## 2020-01-15 PROCEDURE — 6370000000 HC RX 637 (ALT 250 FOR IP): Performed by: INTERNAL MEDICINE

## 2020-01-15 PROCEDURE — 36415 COLL VENOUS BLD VENIPUNCTURE: CPT

## 2020-01-15 PROCEDURE — 6370000000 HC RX 637 (ALT 250 FOR IP): Performed by: PSYCHIATRY & NEUROLOGY

## 2020-01-15 PROCEDURE — 1240000000 HC EMOTIONAL WELLNESS R&B

## 2020-01-15 PROCEDURE — 85025 COMPLETE CBC W/AUTO DIFF WBC: CPT

## 2020-01-15 PROCEDURE — 71046 X-RAY EXAM CHEST 2 VIEWS: CPT

## 2020-01-15 RX ORDER — OXCARBAZEPINE 300 MG/1
300 TABLET, FILM COATED ORAL 2 TIMES DAILY
Status: DISCONTINUED | OUTPATIENT
Start: 2020-01-15 | End: 2020-01-20 | Stop reason: HOSPADM

## 2020-01-15 RX ADMIN — LEVOFLOXACIN 500 MG: 500 TABLET, FILM COATED ORAL at 11:55

## 2020-01-15 RX ADMIN — BENZONATATE 100 MG: 100 CAPSULE ORAL at 19:59

## 2020-01-15 RX ADMIN — OXCARBAZEPINE 300 MG: 300 TABLET, FILM COATED ORAL at 11:55

## 2020-01-15 RX ADMIN — LURASIDONE HYDROCHLORIDE 20 MG: 40 TABLET, FILM COATED ORAL at 08:21

## 2020-01-15 RX ADMIN — BENZONATATE 100 MG: 100 CAPSULE ORAL at 11:55

## 2020-01-15 RX ADMIN — OXCARBAZEPINE 300 MG: 300 TABLET, FILM COATED ORAL at 22:15

## 2020-01-15 NOTE — GROUP NOTE
Group Therapy Note    Date: 1/15/2020    Group Start Time: 8996  Group End Time: 3727  Group Topic: Recreational    [address]    Patient refused to attend Recreational Therapy Group at [phone] after encouragement from staff. 1:1 talk time offered.     Signature:  Mert Banegas

## 2020-01-15 NOTE — GROUP NOTE
Group Therapy Note    Date: 1/15/2020    Group Start Time: 1000  Group End Time: 1114  Group Topic: Psychotherapy    JUAN MANUEL Fleming        Group Therapy Note    Attendees: 3/7         Patient's Goal:  To focus on the here and now with mental health stability. Verbalize acceptance of situations they cannot control. Status After Intervention:  Unchanged    Participation Level:  Active Listener and Interactive    Participation Quality: Appropriate, Attentive, Sharing and Supportive      Speech:  normal      Thought Process/Content: Linear      Affective Functioning: Congruent      Mood: euthymic      Level of consciousness:  Alert, Oriented x4 and Attentive      Response to Learning: Progressing to goal      Endings: None Reported    Modes of Intervention: Education, Support, Socialization and Exploration      Discipline Responsible: /Counselor      Signature:  JUAN MANUEL Joaquin

## 2020-01-15 NOTE — PROGRESS NOTES
Pharmacy Med Education Group Note    Date: 1/15/20  Start Time: 36  End Time: 2888    Number Participants in Group:  11    Goal:  Patient will demonstrate an understanding of the medications intended purpose and possible adverse effects  Topic: [place] for Pharmacy Med Ed Group    Discipline Responsible:     OT  AT  [facility].  RT     X Other       Participation Level:     None  Minimal      X Active Listener    X Interactive    Monopolizing         Participation Quality:    X Appropriate  Inappropriate     X       Attentive        Intrusive          Sharing        Resistant          Supportive        Lethargic       Affective:     X Congruent  Incongruent  Blunted  Flat    Constricted  Anxious  Elated  Angry    Labile  Depressed  Other         Cognitive:    X Alert  Oriented PPTP     Concentration   X G  F  P   Attention Span   X G  F  P   Short-Term Memory   X G  F  P   Long-Term Memory  G  F  P   ProblemSolving/  Decision Making  G  F  P   Ability to Process  Information   X G  F  P      Contributing Factors             Delusional             Hallucinating             Flight of Ideas             Other:       Modes of Intervention:    X Education   X Support  Exploration    Clarifying  Problem Solving  Confrontation    Socialization  Limit Setting  Reality Testing    Activity  Movement  Media    Other:            Response to Learning:    X Able to verbalize current knowledge/experience    Able to verbalize/acknowledge new learning    Able to retain information    Capable of insight    Able to change behavior    Progressing to goal    Other:        Comments:     Margie Fothergill, PharmD, BCPS  1/15/2020 5:22 PM

## 2020-01-15 NOTE — PLAN OF CARE
Problem: Pain:  Goal: Pain level will decrease  Description  Pain level will decrease  Outcome: Ongoing   Pt is satisfied with pain management    Problem: Altered Mood, Deterioration in Function:  Goal: Able to verbalize reality based thinking  Description  Able to verbalize reality based thinking  1/14/2020 2220 by Donald Barbour RN  Outcome: Ongoing   Pt is out of her room for needs only. Interactions are brief but thoughts are reality based. She accepts all medication.

## 2020-01-15 NOTE — GROUP NOTE
Group Therapy Note    Date: 1/14/2020    Group Start Time: 2040  Group End Time: 2110  Group Topic: Wrap-Up / Relaxation     ST[facility] A    [address], RN        Group Therapy Note    Attendees: 12           Signature:  [address], RN

## 2020-01-15 NOTE — BH NOTE
Post Restraint and Seclusion Patient Debriefing     Did debriefing occur? Yes     If No, why not? []? Patient refused  []? Patient is unavailable for debriefing due to:  []? Patient debriefing not completed due to clinical contraindication of:     What events led to the seclusion/restraint incident? Patients being focused on belongings         Did being restrained or in seclusion help you regain control of your behavior? YES     Did you feel safe while you were in restraint or seclusion:       [x]? Very Safe  []? Safe  []? Somewhat Safe  []? Not Safe      Did you have the chance to gain control of your behavior before you were secluded or restrained? YES  If Yes, how:     [x]? Offered Medication  [x]? Talked with Charge Nurse  []? Given Time Out       []? Offered alternatives to restraint/seclusion      During the restraint or seclusion process were you offered medicine to help you gain control? YES        Were your physical and emotional needs met, and your privacy rights addressed while you were in restraints/seclusion? YES        How can we assist you in remaining restraint or seclusion free in the future? Talking with staff         Is there anything else you would like to share regarding this restraint/seclusion episode? No      Patient consented to family or significant other to participate in debriefing No     Patient's guardian participated in debriefing (when applicable) No     Patient's guardian unable to participate in debriefing (when applicable) NA     Staff: Were modifications to the treatment plan completed?  YES

## 2020-01-15 NOTE — PROGRESS NOTES
NEGATIVE NEGATIVE    Ketones, Urine NEGATIVE NEGATIVE    Specific Gravity, UA 1.028 1.000 - 1.030    Urine Hgb NEGATIVE NEGATIVE    pH, UA 6.0 5.0 - 8.0    Protein, UA NEGATIVE NEGATIVE    Urobilinogen, Urine Normal Normal    Nitrite, Urine NEGATIVE NEGATIVE    Leukocyte Esterase, Urine NEGATIVE NEGATIVE    Urinalysis Comments       Microscopic exam not performed based on chemical results unless requested in original order. Urine Drug Screen    Collection Time: 01/13/20  1:45 AM   Result Value Ref Range    Amphetamine Screen, Ur NEGATIVE NEGATIVE    Barbiturate Screen, Ur NEGATIVE NEGATIVE    Benzodiazepine Screen, Urine NEGATIVE NEGATIVE    Cocaine Metabolite, Urine NEGATIVE NEGATIVE    Methadone Screen, Urine NEGATIVE NEGATIVE    Opiates, Urine NEGATIVE NEGATIVE    Phencyclidine, Urine NEGATIVE NEGATIVE    Propoxyphene, Urine NOT REPORTED NEGATIVE    Cannabinoid Scrn, Ur NEGATIVE NEGATIVE    Oxycodone Screen, Ur NEGATIVE NEGATIVE    Methamphetamine, Urine NOT REPORTED NEGATIVE    Tricyclic Antidepressants, Urine NOT REPORTED NEGATIVE    MDMA, Urine NOT REPORTED NEGATIVE    Buprenorphine Urine NOT REPORTED NEGATIVE    Test Information       Assay provides medical screening only. The absence of expected drug(s) and/or metabolite(s) may indicate diluted or adulterated urine, limitations of testing or timing of collection. RAPID INFLUENZA A/B ANTIGENS    Collection Time: 01/14/20  1:45 PM   Result Value Ref Range    Specimen Description . NASOPHARYNGEAL SWAB     Special Requests NOT REPORTED     Direct Exam       PRESUMPTIVE NEGATIVE for Influenza A + B antigens. PCR testing to confirm this result is available upon request.  Specimen will be saved in the laboratory for 7 days. Please call [phone] if PCR testing is indicated.    CBC Auto Differential    Collection Time: 01/15/20  6:57 AM   Result Value Ref Range    WBC 3.7 3.5 - 11.0 k/uL    RBC 4.25 4.0 - 5.2 m/uL    Hemoglobin 12.8 12.0 - 16.0 g/dL    Hematocrit 38.4 36 - 46 %    MCV 90.4 80 - 100 fL    MCH 30.1 26 - 34 pg    MCHC 33.3 31 - 37 g/dL    RDW 13.4 11.5 - 14.9 %    Platelets 410 827 - 521 k/uL    MPV 8.1 6.0 - 12.0 fL    NRBC Automated NOT REPORTED per 100 WBC    Differential Type NOT REPORTED     Immature Granulocytes NOT REPORTED 0 %    Absolute Immature Granulocyte NOT REPORTED 0.00 - 0.30 k/uL    WBC Morphology NOT REPORTED     RBC Morphology NOT REPORTED     Platelet Estimate NOT REPORTED     Seg Neutrophils 37 36 - 66 %    Lymphocytes 44 24 - 44 %    Monocytes 18 (H) 1 - 7 %    Eosinophils % 1 0 - 4 %    Basophils 0 0 - 2 %    Segs Absolute 1.37 1.3 - 9.1 k/uL    Absolute Lymph # 1.62 1.0 - 4.8 k/uL    Absolute Mono # 0.67 0.1 - 1.3 k/uL    Absolute Eos # 0.04 0.0 - 0.4 k/uL    Basophils Absolute 0.00 0.0 - 0.2 k/uL    Morphology Normal            TREATMENT PLAN:     [START ON 1/16/2020] lurasidone  40 mg Oral Daily    OXcarbazepine  300 mg Oral BID    levofloxacin  500 mg Oral Daily     benzonatate, acetaminophen, aluminum & magnesium hydroxide-simethicone, benztropine mesylate, traZODone, magnesium hydroxide, hydrOXYzine, haloperidol lactate **AND** LORazepam **AND** diphenhydrAMINE    Chart was reviewed and the patient has been interviewed  Continue same medications as prescribed above and increase Latuda 40 mg p.o. daily and to start Trileptal 300 mg p.o. twice daily  Patient was discussed with the  and the treatment team.   Provided Supportive and insight-oriented psychotherapy psychotherapy  Recommended involvement in Unit milieu  Provided empathic listening, validation and support  Patient acknowledged and mutually decided to continue with current treatment plan  Discharge planning was discussed with the patient.      Duong Patrick MD        This note was created with the assistance of a speech-recognition program.  Although the intention is to generate a document that actually reflects the content of the visit, no guarantees can be provided that every mistake has been identified and corrected by editing.

## 2020-01-15 NOTE — CONSULTS
[facility]    CONSULTATION / HISTORY AND PHYSICAL EXAMINATION            Date:   1/14/2020  Patient name:  Jesus Aguilar  Date of admission:  1/10/2020 12:29 PM  MRN:   [id number]  Account:  [de-identified]  YOB: 1990  PCP:    No primary care provider on file. Room:   [address] [place] [address]  Code Status:    Full Code    Physician Requesting Consult: Yasmine Shah MD    Reason for Consult: Fever and cough    Chief Complaint:     No chief complaint on file. Fever and cough    History Obtained From:     Patient medical records and nursing staff    History of Present Illness:     64-year-old lady complains of 2-day history of fever cough denies any chest pain no leg pain minimal dyspnea    Past Medical History:     Past Medical History:   Diagnosis Date    [facility]     reports recent diagnosis in Dec 2018, not on meds currently         Past Surgical History:     History reviewed. No pertinent surgical history. Medications Prior to Admission:     Prior to Admission medications    Not on File        Allergies:     Patient has no known allergies. Social History:     Tobacco:    reports that she has never smoked. She has never used smokeless tobacco.  Alcohol:      reports no history of alcohol use. Drug Use:  reports current drug use. Drug: Marijuana. Family History:     Family History   Family history unknown: Yes       Review of Systems:     Positive and Negative as described in HPI. CONSTITUTIONAL: Positive for fever chills, sweats, fatigue, weight loss  HEENT:  negative for vision, hearing changes, runny nose, throat pain  RESPIRATORY: Positive for cough minimal dyspnea and fever cARDIOVASCULAR:  negative for chest pain, palpitations.   GASTROINTESTINAL:  negative for nausea, vomiting, diarrhea, constipation, change in bowel habits, abdominal pain   GENITOURINARY:  negative for difficulty of urination, burning with urination, frequency   INTEGUMENT:  negative for rash, skin lesions, easy bruising   HEMATOLOGIC/LYMPHATIC:  negative for swelling/edema   ALLERGIC/IMMUNOLOGIC:  negative for urticaria , itching  ENDOCRINE:  negative increase in drinking, increase in urination, hot or cold intolerance  MUSCULOSKELETAL:  negative joint pains, muscle aches, swelling of joints  NEUROLOGICAL:  negative for headaches, dizziness, lightheadedness, numbness, pain, tingling extremities      Physical Exam:     /84   Pulse 90   Temp 98.3 °F (36.8 °C) (Oral)   Resp 16   Ht 5' 6\" (1.676 m)   Wt 172 lb (78 kg)   SpO2 100%   BMI 27.76 kg/m²   Temp (24hrs), Av.9 °F (37.2 °C), Min:98.1 °F (36.7 °C), Max:100 °F (37.8 °C)    No results for input(s): POCGLU in the last 72 hours. No intake or output data in the 24 hours ending 20 0604    General Appearance:  alert, well appearing, and in no acute distress  Mental status: oriented to person, place, and time with normal affect  Head:  normocephalic, atraumatic. Eye: no icterus, redness, pupils equal and reactive, extraocular eye movements intact, conjunctiva clear  Ear: normal external ear, no discharge, hearing intact  Nose:  no drainage noted  Mouth: mucous membranes moist  Neck: supple, no carotid bruits, thyroid not palpable  Lungs: Good air entry bilaterally no wheezing no rales no rhonchi  Cardiovascular: normal rate, regular rhythm, no murmur, gallop, rub.   Abdomen: Soft, nontender, nondistended, normal bowel sounds, no hepatomegaly or splenomegaly  Neurologic: There are no new focal motor or sensory deficits, normal muscle tone and bulk, no abnormal sensation, normal speech, cranial nerves II through XII grossly intact  Skin: No gross lesions, rashes, bruising or bleeding on exposed skin area  Extremities:  peripheral pulses palpable, no pedal edema or calf pain with palpation      Investigations:      Laboratory Testing:  Recent Results (from the past 24 hour(s))   RAPID INFLUENZA A/B ANTIGENS    Collection Time: 01/14/20  1:45 PM   Result Value Ref Range    Specimen Description . NASOPHARYNGEAL SWAB     Special Requests NOT REPORTED     Direct Exam       PRESUMPTIVE NEGATIVE for Influenza A + B antigens. PCR testing to confirm this result is available upon request.  Specimen will be saved in the laboratory for 7 days. Please call [phone] if PCR testing is indicated. Imaging/[facility]:      Assessment :      Primary Problem  Psychosis [facility])    Active Hospital Problems    Diagnosis Date Noted    PTSD (post-traumatic stress disorder) [F43.10] 01/11/2020    Psychosis ([facility] [F29] 01/10/2020       Plan:   Fever cough minimal dyspnea rule out influenza chest x-ray 2 views CBC and BMP  1. Consultations:   IP CONSULT TO HISTORY AND PHYSICAL  IP CONSULT TO INTERNAL MEDICINE      Abeba Lopez MD  1/14/2020  10:57 PM    Copy sent to Dr. Jeimy Rodriguez primary care provider on file. Please note that this chart was generated using voice recognition Dragon dictation software. Although every effort was made to ensure the accuracy of this automated transcription, some errors in transcription may have occurred.

## 2020-01-15 NOTE — BH NOTE
Post Restraint and Seclusion Patient Debriefing    Did debriefing occur? Yes    If No, why not? [] Patient refused  [] Patient is unavailable for debriefing due to:  [] Patient debriefing not completed due to clinical contraindication of:    What events led to the seclusion/restraint incident? Patients being focused on belongings       Did being restrained or in seclusion help you regain control of your behavior? YES    Did you feel safe while you were in restraint or seclusion:      [x] Very Safe  [] Safe  [] Somewhat Safe  [] Not Safe     Did you have the chance to gain control of your behavior before you were secluded or restrained? YES  If Yes, how:    [x] Offered Medication  [x] Talked with Charge Nurse  [] Given Time Out      [] Offered alternatives to restraint/seclusion     During the restraint or seclusion process were you offered medicine to help you gain control? YES      Were your physical and emotional needs met, and your privacy rights addressed while you were in restraints/seclusion? YES      How can we assist you in remaining restraint or seclusion free in the future? Talking with staff       Is there anything else you would like to share regarding this restraint/seclusion episode? No     Patient consented to family or significant other to participate in debriefing No    Patient's guardian participated in debriefing (when applicable) No    Patient's guardian unable to participate in debriefing (when applicable) NA    Staff: Were modifications to the treatment plan completed?  YES

## 2020-01-16 LAB
ABSOLUTE EOS #: 0.05 K/UL (ref 0–0.4)
ABSOLUTE IMMATURE GRANULOCYTE: ABNORMAL K/UL (ref 0–0.3)
ABSOLUTE LYMPH #: 2.16 K/UL (ref 1–4.8)
ABSOLUTE MONO #: 0.6 K/UL (ref 0.1–1.3)
BASOPHILS # BLD: 0 % (ref 0–2)
BASOPHILS ABSOLUTE: 0 K/UL (ref 0–0.2)
DIFFERENTIAL TYPE: ABNORMAL
EOSINOPHILS RELATIVE PERCENT: 1 % (ref 0–4)
HCT VFR BLD CALC: 37.4 % (ref 36–46)
HEMOGLOBIN: 12.6 G/DL (ref 12–16)
IMMATURE GRANULOCYTES: ABNORMAL %
LYMPHOCYTES # BLD: 47 % (ref 24–44)
MCH RBC QN AUTO: 30.4 PG (ref 26–34)
MCHC RBC AUTO-ENTMCNC: 33.6 G/DL (ref 31–37)
MCV RBC AUTO: 90.4 FL (ref 80–100)
MONOCYTES # BLD: 13 % (ref 1–7)
MORPHOLOGY: ABNORMAL
NRBC AUTOMATED: ABNORMAL PER 100 WBC
PDW BLD-RTO: 13.5 % (ref 11.5–14.9)
PLATELET # BLD: 198 K/UL (ref 150–450)
PLATELET ESTIMATE: ABNORMAL
PMV BLD AUTO: 8.7 FL (ref 6–12)
RBC # BLD: 4.14 M/UL (ref 4–5.2)
RBC # BLD: ABNORMAL 10*6/UL
SEG NEUTROPHILS: 39 % (ref 36–66)
SEGMENTED NEUTROPHILS ABSOLUTE COUNT: 1.79 K/UL (ref 1.3–9.1)
WBC # BLD: 4.6 K/UL (ref 3.5–11)
WBC # BLD: ABNORMAL 10*3/UL

## 2020-01-16 PROCEDURE — 1240000000 HC EMOTIONAL WELLNESS R&B

## 2020-01-16 PROCEDURE — 36415 COLL VENOUS BLD VENIPUNCTURE: CPT

## 2020-01-16 PROCEDURE — 85025 COMPLETE CBC W/AUTO DIFF WBC: CPT

## 2020-01-16 PROCEDURE — 6370000000 HC RX 637 (ALT 250 FOR IP): Performed by: PSYCHIATRY & NEUROLOGY

## 2020-01-16 PROCEDURE — 99231 SBSQ HOSP IP/OBS SF/LOW 25: CPT | Performed by: INTERNAL MEDICINE

## 2020-01-16 RX ORDER — PRAZOSIN HYDROCHLORIDE 1 MG/1
1 CAPSULE ORAL NIGHTLY
Status: DISCONTINUED | OUTPATIENT
Start: 2020-01-16 | End: 2020-01-20 | Stop reason: HOSPADM

## 2020-01-16 RX ADMIN — OXCARBAZEPINE 300 MG: 300 TABLET, FILM COATED ORAL at 08:00

## 2020-01-16 RX ADMIN — HYDROXYZINE HYDROCHLORIDE 25 MG: 25 TABLET, FILM COATED ORAL at 20:48

## 2020-01-16 RX ADMIN — LURASIDONE HYDROCHLORIDE 40 MG: 40 TABLET, FILM COATED ORAL at 08:14

## 2020-01-16 RX ADMIN — PRAZOSIN HYDROCHLORIDE 1 MG: 1 CAPSULE ORAL at 20:48

## 2020-01-16 RX ADMIN — OXCARBAZEPINE 300 MG: 300 TABLET, FILM COATED ORAL at 20:48

## 2020-01-16 NOTE — PLAN OF CARE
Problem: Pain:  Goal: Pain level will decrease  Description  Pain level will decrease  1/15/2020 2218 by Roly Stephenson RN  Outcome: Ongoing   Pt is satisfied with pain management    Problem: Altered Mood, Manic Behavior:  Goal: Able to sleep  Description  Able to sleep  1/15/2020 2218 by Roly Stephenson RN  Outcome: Ongoing   Pt is able to sleep  Problem: Altered Mood, Deterioration in Function:  Goal: Able to verbalize reality based thinking  Description  Able to verbalize reality based thinking  1/15/2020 2218 by Roly Stephenson RN  Outcome: Ongoing   Thoughts are reality based, she showers and accepts all medication  Problem: Depressive Behavior With or Without Suicide Precautions:  Goal: Able to verbalize acceptance of life and situations over which he or she has no control  Description  Able to verbalize acceptance of life and situations over which he or she has no control  1/15/2020 2218 by Roly Stephenson RN  Outcome: Ongoing   She reports that she is feeling much better and that the medication is helping. She apologizes to staff for her behaviors when she first came in.
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
## 2020-01-16 NOTE — BH NOTE
patient refused to attend wellness group at 1600 after encouragement from staff.   1:1 talk time offered as alternative to group session

## 2020-01-16 NOTE — PROGRESS NOTES
Direct Exam       PRESUMPTIVE NEGATIVE for Influenza A + B antigens. PCR testing to confirm this result is available upon request.  Specimen will be saved in the laboratory for 7 days. Please call [phone] if PCR testing is indicated.    CBC Auto Differential    Collection Time: 01/15/20  6:57 AM   Result Value Ref Range    WBC 3.7 3.5 - 11.0 k/uL    RBC 4.25 4.0 - 5.2 m/uL    Hemoglobin 12.8 12.0 - 16.0 g/dL    Hematocrit 38.4 36 - 46 %    MCV 90.4 80 - 100 fL    MCH 30.1 26 - 34 pg    MCHC 33.3 31 - 37 g/dL    RDW 13.4 11.5 - 14.9 %    Platelets 847 275 - 809 k/uL    MPV 8.1 6.0 - 12.0 fL    NRBC Automated NOT REPORTED per 100 WBC    Differential Type NOT REPORTED     Immature Granulocytes NOT REPORTED 0 %    Absolute Immature Granulocyte NOT REPORTED 0.00 - 0.30 k/uL    WBC Morphology NOT REPORTED     RBC Morphology NOT REPORTED     Platelet Estimate NOT REPORTED     Seg Neutrophils 37 36 - 66 %    Lymphocytes 44 24 - 44 %    Monocytes 18 (H) 1 - 7 %    Eosinophils % 1 0 - 4 %    Basophils 0 0 - 2 %    Segs Absolute 1.37 1.3 - 9.1 k/uL    Absolute Lymph # 1.62 1.0 - 4.8 k/uL    Absolute Mono # 0.67 0.1 - 1.3 k/uL    Absolute Eos # 0.04 0.0 - 0.4 k/uL    Basophils Absolute 0.00 0.0 - 0.2 k/uL    Morphology Normal    CBC Auto Differential    Collection Time: 01/16/20  6:02 AM   Result Value Ref Range    WBC 4.6 3.5 - 11.0 k/uL    RBC 4.14 4.0 - 5.2 m/uL    Hemoglobin 12.6 12.0 - 16.0 g/dL    Hematocrit 37.4 36 - 46 %    MCV 90.4 80 - 100 fL    MCH 30.4 26 - 34 pg    MCHC 33.6 31 - 37 g/dL    RDW 13.5 11.5 - 14.9 %    Platelets 291 637 - 159 k/uL    MPV 8.7 6.0 - 12.0 fL    NRBC Automated NOT REPORTED per 100 WBC    Differential Type NOT REPORTED     Immature Granulocytes NOT REPORTED 0 %    Absolute Immature Granulocyte NOT REPORTED 0.00 - 0.30 k/uL    WBC Morphology NOT REPORTED     RBC Morphology NOT REPORTED     Platelet Estimate NOT REPORTED     Seg Neutrophils 39 36 - 66 %

## 2020-01-16 NOTE — GROUP NOTE
Group Therapy Note    Date: 1/16/2020    Group Start Time: 1100  Group End Time: 2911  Group Topic: Recreational    JULES Floyd, CTRS    Patient was not able to attend Recreation Therapy Group due to meeting with staff. 1:1 talk time offered but patient refused.      Signature:  Lana Pike

## 2020-01-16 NOTE — GROUP NOTE
Group Therapy Note    Date: 1/15/2020    Group Start Time: 2030  Group End Time: 2117  Group Topic: Wrap-Up    JULES Aden        Group Therapy Note    Attendees: 15         Patient's Goal:  Wants to learn assertiveness    Notes:  Needs to be able to be less aggressive in speaking with peers    Status After Intervention:  Improved    Participation Level:  Active Listener    Participation Quality: Appropriate      Speech:  normal      Thought Process/Content: Linear      Affective Functioning: Congruent      Mood: anxious      Level of consciousness:  Alert      Response to Learning: Capable of insight      Endings: None Reported    Modes of Intervention: Problem-solving      Discipline Responsible: [employer]      Signature:  Marcelo Naqvi

## 2020-01-17 PROCEDURE — 1240000000 HC EMOTIONAL WELLNESS R&B

## 2020-01-17 PROCEDURE — 6370000000 HC RX 637 (ALT 250 FOR IP): Performed by: PSYCHIATRY & NEUROLOGY

## 2020-01-17 PROCEDURE — 6370000000 HC RX 637 (ALT 250 FOR IP): Performed by: INTERNAL MEDICINE

## 2020-01-17 RX ADMIN — PRAZOSIN HYDROCHLORIDE 1 MG: 1 CAPSULE ORAL at 21:10

## 2020-01-17 RX ADMIN — HYDROXYZINE HYDROCHLORIDE 25 MG: 25 TABLET, FILM COATED ORAL at 21:10

## 2020-01-17 RX ADMIN — OXCARBAZEPINE 300 MG: 300 TABLET, FILM COATED ORAL at 08:27

## 2020-01-17 RX ADMIN — LEVOFLOXACIN 500 MG: 500 TABLET, FILM COATED ORAL at 08:27

## 2020-01-17 RX ADMIN — BENZONATATE 100 MG: 100 CAPSULE ORAL at 17:18

## 2020-01-17 RX ADMIN — OXCARBAZEPINE 300 MG: 300 TABLET, FILM COATED ORAL at 21:10

## 2020-01-17 RX ADMIN — LURASIDONE HYDROCHLORIDE 40 MG: 40 TABLET, FILM COATED ORAL at 08:27

## 2020-01-17 NOTE — PLAN OF CARE
Problem: Depressive Behavior With or Without Suicide Precautions:  Goal: Able to verbalize acceptance of life and situations over which he or she has no control  Description  Able to verbalize acceptance of life and situations over which he or she has no control  1/17/2020 1051 by Angeline Perez LPN  Outcome: Ongoing     Problem: Altered Mood, Deterioration in Function:  Goal: Able to verbalize reality based thinking  Description  Able to verbalize reality based thinking  1/17/2020 1051 by Angeline Perez LPN  Outcome: Ongoing     Problem: Altered Mood, Manic Behavior:  Goal: Able to sleep  Description  Able to sleep  1/17/2020 1051 by Angeline Perez LPN  Outcome: Ongoing  Pt calm and cooperative upon approach. Pt is able to verbalize reality based thinking during talk time. Pt denies suicidal/homicidal ideations and agrees to feeling safe on the unit. Pt is attending scheduled programming on the unit. Pt is medication compliant. Pt reports adequate sleep and nutrition. Pt. Remains on q15 min checks and frequent spontaneous checks throughout shift. Pt. Safety maintained.

## 2020-01-17 NOTE — CONSULTS
[facility] Internal Medicine    CONSULTATION / HISTORY AND PHYSICAL EXAMINATION            Date:   1/16/2020  Patient name:  Gian Mendez  Date of admission:  1/10/2020 12:29 PM  MRN:   [id number]  Account:  [de-identified]  YOB: 1990  PCP:    No primary care provider on file. Room:   [address] [place] [address]  Code Status:    Full Code    Physician Requesting Consult: Bernadette St MD    Reason for Consult: Fever and cough    Chief Complaint:     No chief complaint on file. Fever and cough    History Obtained From:     Patient medical records and nursing staff    History of Present Illness:     71-year-old lady complains of 2-day history of fever cough denies any chest pain no leg pain minimal dyspnea    Past Medical History:     Past Medical History:   Diagnosis Date    [facility]     reports recent diagnosis in Dec 2018, not on meds currently         Past Surgical History:     History reviewed. No pertinent surgical history. Medications Prior to Admission:     Prior to Admission medications    Not on File        Allergies:     Patient has no known allergies. Social History:     Tobacco:    reports that she has never smoked. She has never used smokeless tobacco.  Alcohol:      reports no history of alcohol use. Drug Use:  reports current drug use. Drug: Marijuana. Family History:     Family History   Family history unknown: Yes       Review of Systems:     Positive and Negative as described in HPI. CONSTITUTIONAL: Positive for fever chills, sweats, fatigue, weight loss  HEENT:  negative for vision, hearing changes, runny nose, throat pain  RESPIRATORY: Positive for cough minimal dyspnea and fever cARDIOVASCULAR:  negative for chest pain, palpitations.   GASTROINTESTINAL:  negative for nausea, vomiting, diarrhea, constipation, change in bowel habits, abdominal pain   GENITOURINARY:  negative for difficulty of urination, burning with urination, frequency   INTEGUMENT:  negative for rash, skin lesions, easy bruising   HEMATOLOGIC/LYMPHATIC:  negative for swelling/edema   ALLERGIC/IMMUNOLOGIC:  negative for urticaria , itching  ENDOCRINE:  negative increase in drinking, increase in urination, hot or cold intolerance  MUSCULOSKELETAL:  negative joint pains, muscle aches, swelling of joints  NEUROLOGICAL:  negative for headaches, dizziness, lightheadedness, numbness, pain, tingling extremities      Physical Exam:     /76   Pulse 93   Temp 98.2 °F (36.8 °C) (Oral)   Resp 14   Ht 5' 6\" (1.676 m)   Wt 172 lb (78 kg)   SpO2 100%   BMI 27.76 kg/m²   Temp (24hrs), Av.2 °F (36.8 °C), Min:98.1 °F (36.7 °C), Max:98.2 °F (36.8 °C)    No results for input(s): POCGLU in the last 72 hours. No intake or output data in the 24 hours ending 20 1192    General Appearance:  alert, well appearing, and in no acute distress  Mental status: oriented to person, place, and time with normal affect  Head:  normocephalic, atraumatic. Eye: no icterus, redness, pupils equal and reactive, extraocular eye movements intact, conjunctiva clear  Ear: normal external ear, no discharge, hearing intact  Nose:  no drainage noted  Mouth: mucous membranes moist  Neck: supple, no carotid bruits, thyroid not palpable  Lungs: Good air entry bilaterally no wheezing no rales no rhonchi  Cardiovascular: normal rate, regular rhythm, no murmur, gallop, rub.   Abdomen: Soft, nontender, nondistended, normal bowel sounds, no hepatomegaly or splenomegaly  Neurologic: There are no new focal motor or sensory deficits, normal muscle tone and bulk, no abnormal sensation, normal speech, cranial nerves II through XII grossly intact  Skin: No gross lesions, rashes, bruising or bleeding on exposed skin area  Extremities:  peripheral pulses palpable, no pedal edema or calf pain with palpation      Investigations:      Laboratory Testing:  Recent Results (from the past 24 hour(s))   CBC Auto Differential    Collection Time: 01/16/20  6:02 AM   Result Value Ref Range    WBC 4.6 3.5 - 11.0 k/uL    RBC 4.14 4.0 - 5.2 m/uL    Hemoglobin 12.6 12.0 - 16.0 g/dL    Hematocrit 37.4 36 - 46 %    MCV 90.4 80 - 100 fL    MCH 30.4 26 - 34 pg    MCHC 33.6 31 - 37 g/dL    RDW 13.5 11.5 - 14.9 %    Platelets 289 156 - 056 k/uL    MPV 8.7 6.0 - 12.0 fL    NRBC Automated NOT REPORTED per 100 WBC    Differential Type NOT REPORTED     Immature Granulocytes NOT REPORTED 0 %    Absolute Immature Granulocyte NOT REPORTED 0.00 - 0.30 k/uL    WBC Morphology NOT REPORTED     RBC Morphology NOT REPORTED     Platelet Estimate NOT REPORTED     Seg Neutrophils 39 36 - 66 %    Lymphocytes 47 (H) 24 - 44 %    Monocytes 13 (H) 1 - 7 %    Eosinophils % 1 0 - 4 %    Basophils 0 0 - 2 %    Segs Absolute 1.79 1.3 - 9.1 k/uL    Absolute Lymph # 2.16 1.0 - 4.8 k/uL    Absolute Mono # 0.60 0.1 - 1.3 k/uL    Absolute Eos # 0.05 0.0 - 0.4 k/uL    Basophils Absolute 0.00 0.0 - 0.2 k/uL    Morphology ANISOCYTOSIS PRESENT     Morphology 1+ ELLIPTOCYTES     Morphology 1+ ECHINOCYTES     Morphology 1+ TEARDROPS        Imaging/Diagonstics:      Assessment :      Primary Problem  Psychosis ([facility]    Active Hospital Problems    Diagnosis Date Noted    PTSD (post-traumatic stress disorder) [F43.10] 01/11/2020    Psychosis ([facility] [F29] 01/10/2020       Plan:   Fever cough minimal dyspnea rule out influenza chest x-ray 2 views CBC and BMP  1. January 16  2. Labs reviewed  3. Chest x-ray noted negative symptoms improving continue with the current treatment we will sign off please call as needed    Consultations:   [address] [facility],First [facility], MD  1/16/2020  10:57 PM    Copy sent to Dr. Stephie Dubois primary care provider on file. Please note that this chart was generated using voice recognition Dragon dictation software.   Although every effort was made to ensure the accuracy of this automated

## 2020-01-18 PROCEDURE — 6370000000 HC RX 637 (ALT 250 FOR IP): Performed by: PSYCHIATRY & NEUROLOGY

## 2020-01-18 PROCEDURE — 6370000000 HC RX 637 (ALT 250 FOR IP): Performed by: INTERNAL MEDICINE

## 2020-01-18 PROCEDURE — 1240000000 HC EMOTIONAL WELLNESS R&B

## 2020-01-18 RX ORDER — DIPHENHYDRAMINE HCL 25 MG
25 TABLET ORAL 3 TIMES DAILY
Status: DISCONTINUED | OUTPATIENT
Start: 2020-01-18 | End: 2020-01-20 | Stop reason: HOSPADM

## 2020-01-18 RX ADMIN — OXCARBAZEPINE 300 MG: 300 TABLET, FILM COATED ORAL at 08:07

## 2020-01-18 RX ADMIN — LEVOFLOXACIN 500 MG: 500 TABLET, FILM COATED ORAL at 08:07

## 2020-01-18 RX ADMIN — DIPHENHYDRAMINE HCL 25 MG: 25 TABLET ORAL at 15:35

## 2020-01-18 RX ADMIN — PRAZOSIN HYDROCHLORIDE 1 MG: 1 CAPSULE ORAL at 21:35

## 2020-01-18 RX ADMIN — LURASIDONE HYDROCHLORIDE 40 MG: 40 TABLET, FILM COATED ORAL at 08:07

## 2020-01-18 RX ADMIN — DIPHENHYDRAMINE HCL 25 MG: 25 TABLET ORAL at 21:35

## 2020-01-18 RX ADMIN — HYDROXYZINE HYDROCHLORIDE 25 MG: 25 TABLET, FILM COATED ORAL at 10:01

## 2020-01-18 RX ADMIN — OXCARBAZEPINE 300 MG: 300 TABLET, FILM COATED ORAL at 21:35

## 2020-01-18 ASSESSMENT — PAIN SCALES - GENERAL: PAINLEVEL_OUTOF10: 4

## 2020-01-18 ASSESSMENT — PAIN DESCRIPTION - LOCATION: LOCATION: THROAT

## 2020-01-18 NOTE — PROGRESS NOTES
PROGRESS NOTE  The chart has been reviewed and the patient was interviewed at the bedside. The patient was a pleasant and cooperative during the evaluation. She denies side effects of her medications. She showed some improvement in her mood and impulse control. There was no behavioral problem reported by the nursing staff. The patient denied side effects of her medications. The patient attended some of the therapy groups in the unit. The patient reported symptoms of UTI and urine analysis has been ordered. We will continue monitoring for symptoms of depression, irritability and aggression. We will adjust her medications as needed. MENTAL STATUS EXAMINATION:    /71   Pulse 100   Temp 98.3 °F (36.8 °C) (Oral)   Resp 14   Ht 5' 6\" (1.676 m)   Wt 172 lb (78 kg)   SpO2 100%   BMI 27.76 kg/m²     MOOD-is dysphoric   Affect-is congruent  Patient feels helpless hopeless and worthless  Psychomotor activity : decreased. APPEARANCE:  Personal hygiene is poor and patient is neglecting his appearance. Patient is somewhat unkempt  PSYCHOSIS:  Denies auditory or visual hallucinations. Denies paranoid delusions. ORIENTATION:  Oriented to time place and person. Recent and remote memory is grossly intact. Intelligence appears to be average  CONCENTRATION:  poor. SPEECH : goal directed , but slow . DIAGNOSIS:    Principal Problem:    Psychosis ([address]  Active Problems:    PTSD (post-traumatic stress disorder)  Resolved Problems:    * No resolved hospital problems.  *      LAB:    Recent Results (from the past 72 hour(s))   CBC Auto Differential    Collection Time: 01/16/20  6:02 AM   Result Value Ref Range    WBC 4.6 3.5 - 11.0 k/uL    RBC 4.14 4.0 - 5.2 m/uL    Hemoglobin 12.6 12.0 - 16.0 g/dL Hematocrit 37.4 36 - 46 %    MCV 90.4 80 - 100 fL    MCH 30.4 26 - 34 pg    MCHC 33.6 31 - 37 g/dL    RDW 13.5 11.5 - 14.9 %    Platelets 189 763 - 372 k/uL    MPV 8.7 6.0 - 12.0 fL    NRBC Automated NOT REPORTED per 100 WBC    Differential Type NOT REPORTED     Immature Granulocytes NOT REPORTED 0 %    Absolute Immature Granulocyte NOT REPORTED 0.00 - 0.30 k/uL    WBC Morphology NOT REPORTED     RBC Morphology NOT REPORTED     Platelet Estimate NOT REPORTED     Seg Neutrophils 39 36 - 66 %    Lymphocytes 47 (H) 24 - 44 %    Monocytes 13 (H) 1 - 7 %    Eosinophils % 1 0 - 4 %    Basophils 0 0 - 2 %    Segs Absolute 1.79 1.3 - 9.1 k/uL    Absolute Lymph # 2.16 1.0 - 4.8 k/uL    Absolute Mono # 0.60 0.1 - 1.3 k/uL    Absolute Eos # 0.05 0.0 - 0.4 k/uL    Basophils Absolute 0.00 0.0 - 0.2 k/uL    Morphology ANISOCYTOSIS PRESENT     Morphology 1+ ELLIPTOCYTES     Morphology 1+ ECHINOCYTES     Morphology 1+ TEARDROPS            TREATMENT PLAN:     diphenhydrAMINE  25 mg Oral TID    prazosin  1 mg Oral Nightly    lurasidone  40 mg Oral Daily    OXcarbazepine  300 mg Oral BID    levofloxacin  500 mg Oral Daily     benzonatate, acetaminophen, aluminum & magnesium hydroxide-simethicone, benztropine mesylate, traZODone, magnesium hydroxide, hydrOXYzine, haloperidol lactate **AND** LORazepam **AND** diphenhydrAMINE    Chart was reviewed and the patient has been interviewed  Continue same medications as prescribed above and start levofloxacin antibiotic for UTI  Patient was discussed with the  and the treatment team.   Provided Supportive and insight-oriented psychotherapy psychotherapy  Recommended involvement in Unit milieu  Provided empathic listening, validation and support  Patient acknowledged and mutually decided to continue with current treatment plan  Discharge planning was discussed with the patient.      Ha Kessler MD        This note was created with the assistance of a speech-recognition program. Although the intention is to generate a document that actually reflects the content of the visit, no guarantees can be provided that every mistake has been identified and corrected by editing.
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
## 2020-01-18 NOTE — PLAN OF CARE
Problem: Pain:  Goal: Pain level will decrease  Description  Pain level will decrease  1/18/2020 1102 by Lacey Messer RN  Outcome: Ongoing     Problem: Pain:  Goal: Control of acute pain  Description  Control of acute pain  1/18/2020 1102 by Lacey Messer RN  Outcome: Ongoing     Problem: Pain:  Goal: Control of chronic pain  Description  Control of chronic pain  1/18/2020 1102 by Lacey Messer RN  Outcome: Ongoing   Patient reports pain is managed. Problem: Altered Mood, Manic Behavior:  Goal: Able to sleep  Description  Able to sleep  1/18/2020 1102 by Lacey Messer RN  Outcome: Ongoing   Patient reports that she has been sleeping okay. Problem: Altered Mood, Deterioration in Function:  Goal: Able to verbalize reality based thinking  Description  Able to verbalize reality based thinking  1/18/2020 1102 by Lacey Messer RN  Outcome: Ongoing   Patient reports that she knows she needed to be here and is working on getting better. Problem: Depressive Behavior With or Without Suicide Precautions:  Goal: Able to verbalize acceptance of life and situations over which he or she has no control  Description  Able to verbalize acceptance of life and situations over which he or she has no control  1/18/2020 1102 by Lacey Messer RN  Outcome: Ongoing   Patient reports she is working on her problems and finding ways to handle them. Patient denies suicidal ideation, homicidal ideation, visual hallucinations and auditory hallucinations. Patient is accepting of nourishment from staff. Remains behavioral control and med compliant. Patient showered with encouragement from staff. Q15 minute checks maintained.

## 2020-01-19 PROBLEM — F29 PSYCHOSIS (HCC): Status: RESOLVED | Noted: 2020-01-10 | Resolved: 2020-01-19

## 2020-01-19 PROCEDURE — 1240000000 HC EMOTIONAL WELLNESS R&B

## 2020-01-19 PROCEDURE — 6370000000 HC RX 637 (ALT 250 FOR IP): Performed by: PSYCHIATRY & NEUROLOGY

## 2020-01-19 RX ORDER — PRAZOSIN HYDROCHLORIDE 1 MG/1
1 CAPSULE ORAL NIGHTLY
Qty: 30 CAPSULE | Refills: 3 | Status: ON HOLD | OUTPATIENT
Start: 2020-01-19 | End: 2021-09-18

## 2020-01-19 RX ORDER — OXCARBAZEPINE 300 MG/1
300 TABLET, FILM COATED ORAL 2 TIMES DAILY
Qty: 60 TABLET | Refills: 3 | Status: ON HOLD | OUTPATIENT
Start: 2020-01-19 | End: 2021-09-18

## 2020-01-19 RX ADMIN — HYDROXYZINE HYDROCHLORIDE 25 MG: 25 TABLET, FILM COATED ORAL at 21:38

## 2020-01-19 RX ADMIN — DIPHENHYDRAMINE HCL 25 MG: 25 TABLET ORAL at 13:37

## 2020-01-19 RX ADMIN — LURASIDONE HYDROCHLORIDE 40 MG: 40 TABLET, FILM COATED ORAL at 08:05

## 2020-01-19 RX ADMIN — OXCARBAZEPINE 300 MG: 300 TABLET, FILM COATED ORAL at 21:38

## 2020-01-19 RX ADMIN — OXCARBAZEPINE 300 MG: 300 TABLET, FILM COATED ORAL at 08:05

## 2020-01-19 RX ADMIN — DIPHENHYDRAMINE HCL 25 MG: 25 TABLET ORAL at 08:05

## 2020-01-19 RX ADMIN — Medication 3 MG: at 21:39

## 2020-01-19 RX ADMIN — PRAZOSIN HYDROCHLORIDE 1 MG: 1 CAPSULE ORAL at 21:38

## 2020-01-19 RX ADMIN — DIPHENHYDRAMINE HCL 25 MG: 25 TABLET ORAL at 21:38

## 2020-01-19 NOTE — PLAN OF CARE
Problem: Altered Mood, Deterioration in Function:  Goal: Able to verbalize reality based thinking  Description  Able to verbalize reality based thinking  1/18/2020 2018 by Matilde Enrique RN  Outcome: Ongoing   Thoughts are reality based being focused on probable discharge Monday. Problem: Altered Mood, Manic Behavior:  Goal: Able to sleep  Description  Able to sleep  1/18/2020 2018 by Matilde Enrique, RN  Outcome: Ongoing   She has been sleeping well. Problem: Pain:  Goal: Control of acute pain  Description  Control of acute pain  1/18/2020 2018 by Matilde Enrique RN  Outcome: Ongoing   Denies pain @ this x.

## 2020-01-19 NOTE — GROUP NOTE
Group Therapy Note    Date: 1/19/2020    Group Start Time: 0845  Group End Time: 0920  Group Topic: Community Meeting    [address]    Patient refused to attend Community Meeting/Goal Setting Group after encouragement from staff. 1:1 talk time offered but patient refused.      Signature:  Loreta Hutson

## 2020-01-19 NOTE — GROUP NOTE
Group Therapy Note    Date: 1/19/2020    Group Start Time: 1330  Group End Time: 6146  Group Topic: Cognitive Skills    JULES Wallace, CTRS    Patient did not attend Cognitive Skills Group after encouragement from staff. 1:1 talk time offered but patient refused.      Signature:  Trevon Peters

## 2020-01-20 VITALS
WEIGHT: 172 LBS | SYSTOLIC BLOOD PRESSURE: 102 MMHG | TEMPERATURE: 98.4 F | HEIGHT: 66 IN | OXYGEN SATURATION: 100 % | RESPIRATION RATE: 14 BRPM | HEART RATE: 78 BPM | BODY MASS INDEX: 27.64 KG/M2 | DIASTOLIC BLOOD PRESSURE: 54 MMHG

## 2020-01-20 PROCEDURE — 5130000000 HC BRIDGE APPOINTMENT

## 2020-01-20 PROCEDURE — 6370000000 HC RX 637 (ALT 250 FOR IP): Performed by: PSYCHIATRY & NEUROLOGY

## 2020-01-20 RX ADMIN — DIPHENHYDRAMINE HCL 25 MG: 25 TABLET ORAL at 08:49

## 2020-01-20 RX ADMIN — OXCARBAZEPINE 300 MG: 300 TABLET, FILM COATED ORAL at 08:49

## 2020-01-20 RX ADMIN — LURASIDONE HYDROCHLORIDE 40 MG: 40 TABLET, FILM COATED ORAL at 08:49

## 2020-01-20 RX ADMIN — DIPHENHYDRAMINE HCL 25 MG: 25 TABLET ORAL at 14:29

## 2020-01-20 NOTE — GROUP NOTE
Group Therapy Note    Date: 1/20/2020    Group Start Time: 0900  Group End Time: 0920  Group Topic: Community Meeting    JULES Knight, [address]    Group Therapy Note    Attendees: 10         Patient's Goal:  Discharge today. Work on finding an apartment. Notes:  Patient attended group and actively participated. Patient conversed appropriately with peers and Lloyd Hasten. Status After Intervention:  Improved    Participation Level:  Active Listener and Interactive    Participation Quality: Appropriate, Attentive and Sharing      Speech:  normal      Thought Process/Content: Logical      Affective Functioning: Congruent      Mood: euthymic      Level of consciousness:  Alert, Oriented x4 and Attentive      Response to Learning: Able to verbalize current knowledge/experience, Able to verbalize/acknowledge new learning, Able to retain information, Capable of insight and Progressing to goal      Endings: None Reported       Modes of Intervention: Education, Support, Socialization, Exploration, Clarifying, Problem-solving, Confrontation, Limit-setting and Reality-testing      Discipline Responsible: Psychoeducational Specialist      Signature:  Adeline Snyder

## 2020-01-20 NOTE — PROGRESS NOTES
PROGRESS NOTE  The chart has been reviewed and the patient was interviewed in the common area. The patient reported significant improvement in her mood. She denied current thoughts of harming self or others. There was no major mood swings reported by the nursing staff. The patient has been compliant with her medications. The patient was a pleasant and cooperative during the evaluation. The safety plan has been discussed with the patient. The discharge plan and the relapse prevention plan has been discussed with the patient. She agreed to be discharged today. MENTAL STATUS EXAMINATION:    BP (!) 102/54   Pulse 78   Temp 98.4 °F (36.9 °C) (Oral)   Resp 14   Ht 5' 6\" (1.676 m)   Wt 172 lb (78 kg)   SpO2 100%   BMI 27.76 kg/m²     MOOD-is euthymic affect-is congruent  The patient denied helpless hopeless and worthless  Psychomotor activity : Normal limits   APPEARANCE:  Personal hygiene is fair   PSYCHOSIS:  Denies auditory or visual hallucinations. Denies paranoid delusions. ORIENTATION:  Oriented to time place and person. Recent and remote memory is grossly intact. Intelligence appears to be average  CONCENTRATION:  poor. SPEECH : goal directed , but slow . DIAGNOSIS:    Principal Problem:    PTSD (post-traumatic stress disorder)  Resolved Problems:    Psychosis ([facility] [address]      LAB:    No results found for this or any previous visit (from the past 72 hour(s)).         TREATMENT PLAN:     diphenhydrAMINE  25 mg Oral TID    prazosin  1 mg Oral Nightly    lurasidone  40 mg Oral Daily    OXcarbazepine  300 mg Oral BID     melatonin ER, benzonatate, acetaminophen, aluminum & magnesium hydroxide-simethicone, benztropine mesylate, traZODone, magnesium hydroxide, hydrOXYzine, haloperidol lactate **AND** LORazepam **AND** diphenhydrAMINE    Chart was reviewed the patient has been interviewed  The same medication as prescribed above  Patient was discussed with the  and the treatment team.   Provided Supportive and insight-oriented psychotherapy psychotherapy  Recommended involvement in Unit milieu  Provided empathic listening, validation and support  Patient acknowledged and mutually decided to continue with current treatment plan  Discharge planning was discussed with the patient patient plan to be discharged today. Blake Yoo MD        This note was created with the assistance of a speech-recognition program.  Although the intention is to generate a document that actually reflects the content of the visit, no guarantees can be provided that every mistake has been identified and corrected by editing.

## 2020-01-20 NOTE — GROUP NOTE
Group Therapy Note    Date: 1/20/2020    Group Start Time: 1100  Group End Time: 3422  Group Topic: Recreational    JULES Wallace, CTRS    Patient was not able to attend Recreation Therapy Group due to meeting with staff to plan for hospital discharge.      Signature:  Trevon Peters

## 2020-01-20 NOTE — GROUP NOTE
Group Therapy Note    Date: 1/20/2020    Group Start Time: 1330  Group End Time: 5433  Group Topic: Cognitive Skills    JULES Regalado, CTRS    Patient was not able to attend Cognitive Skills Group due to meeting with staff to plan hospital discharge.      Signature:  Elías Mcdowell

## 2020-01-20 NOTE — GROUP NOTE
Group Therapy Note    Date: 1/20/2020    Group Start Time: 1000  Group End Time: 6208  Group Topic: Psychotherapy    JULES Naqvi, MSW, LSW        Group Therapy Note    Attendees: 5/9       Patient's Goal: Interpersonal relationships and communication     Notes:  Pt discussed psychosis, sharing and attentive with peers. Future focused with discharge planning.  Identified increased stressors prior to admission     Status After Intervention:  Improved    Participation Level: Interactive    Participation Quality: Appropriate, Attentive, Sharing and Supportive      Speech:  normal      Thought Process/Content: Logical  Linear      Affective Functioning: Congruent      Mood: euthymic      Level of consciousness:  Alert, Oriented x4 and Attentive      Response to Learning: Able to verbalize current knowledge/experience, Able to retain information, Capable of insight, Able to change behavior and Progressing to goal      Endings: None reported    Modes of Intervention: Support      Discipline Responsible: /Counselor

## 2020-01-21 NOTE — CARE COORDINATION
Name: Stas Urbano    : 1990    Discharge Date: 2020    Primary Auth/Cert #: [id number]    Discharge Medications:      Medication List      START taking these medications    lurasidone 40 MG Tabs tablet  Commonly known as:  LATUDA  Take 1 tablet by mouth daily  Notes to patient:  antidepressant     OXcarbazepine 300 MG tablet  Commonly known as:  TRILEPTAL  Take 1 tablet by mouth 2 times daily  Notes to patient:  Mood stabilizer     prazosin 1 MG capsule  Commonly known as:  MINIPRESS  Take 1 capsule by mouth nightly  Notes to patient:  nightmares           Where to Get Your Medications      These medications were sent to [facility], [place] [address]    Phone:  [phone]   · lurasidone 40 MG Tabs tablet  · OXcarbazepine 300 MG tablet  · prazosin 1 MG capsule         Follow Up Appointment: [facility]  [address] [place] [address]  phone: [phone]  Fax: [phone]    Women must go to [facility] to complete assessment and they will deteremine if quailify for shelter services.      [address]  P: [phone]  F: [phone]  On 2020  @1:30PM for intake and [address] [place] [address]  Address: [address] [place] [address]  Phone: [phone]

## 2020-01-21 NOTE — PROGRESS NOTES
CLINICAL PHARMACY NOTE: MEDS TO [address] [employer] [facility] Patient?: No  Total # of Prescriptions Filled: 3   The following medications were delivered to the patient:  · Latuda, prazosin, oxcarbazepine  Total # of Interventions Completed: 2  Time Spent (min): 30    Additional Documentation:  latuda needed a PA-it was denied and a voucher was rec'd for a 14 day supply

## 2021-09-17 PROBLEM — F23 ACUTE PSYCHOSIS (HCC): Status: ACTIVE | Noted: 2021-09-17

## 2021-09-18 ENCOUNTER — HOSPITAL ENCOUNTER (INPATIENT)
Age: 31
LOS: 5 days | Discharge: HOME | DRG: 751 | End: 2021-09-23
Attending: PSYCHIATRY & NEUROLOGY | Admitting: PSYCHIATRY & NEUROLOGY
Payer: COMMERCIAL

## 2021-09-18 PROCEDURE — APPSS60 APP SPLIT SHARED TIME 46-60 MINUTES

## 2021-09-18 PROCEDURE — 6370000000 HC RX 637 (ALT 250 FOR IP)

## 2021-09-18 PROCEDURE — 1240000000 HC EMOTIONAL WELLNESS R&B

## 2021-09-18 PROCEDURE — 90792 PSYCH DIAG EVAL W/MED SRVCS: CPT | Performed by: PSYCHIATRY & NEUROLOGY

## 2021-09-18 PROCEDURE — 2720000011 HC SANE KIT SUPPLY STERILE

## 2021-09-18 RX ORDER — HALOPERIDOL 5 MG/ML
5 INJECTION INTRAMUSCULAR EVERY 6 HOURS PRN
Status: DISCONTINUED | OUTPATIENT
Start: 2021-09-18 | End: 2021-09-23 | Stop reason: HOSPADM

## 2021-09-18 RX ORDER — POLYETHYLENE GLYCOL 3350 17 G/17G
17 POWDER, FOR SOLUTION ORAL DAILY PRN
Status: DISCONTINUED | OUTPATIENT
Start: 2021-09-18 | End: 2021-09-23 | Stop reason: HOSPADM

## 2021-09-18 RX ORDER — ACETAMINOPHEN 325 MG/1
650 TABLET ORAL EVERY 4 HOURS PRN
Status: DISCONTINUED | OUTPATIENT
Start: 2021-09-18 | End: 2021-09-23 | Stop reason: HOSPADM

## 2021-09-18 RX ORDER — HYDROXYZINE 50 MG/1
50 TABLET, FILM COATED ORAL 3 TIMES DAILY PRN
Status: DISCONTINUED | OUTPATIENT
Start: 2021-09-18 | End: 2021-09-23 | Stop reason: HOSPADM

## 2021-09-18 RX ORDER — PALIPERIDONE 3 MG/1
3 TABLET, EXTENDED RELEASE ORAL DAILY
Status: DISCONTINUED | OUTPATIENT
Start: 2021-09-18 | End: 2021-09-19

## 2021-09-18 RX ORDER — TRAZODONE HYDROCHLORIDE 50 MG/1
50 TABLET ORAL NIGHTLY PRN
Status: DISCONTINUED | OUTPATIENT
Start: 2021-09-18 | End: 2021-09-23 | Stop reason: HOSPADM

## 2021-09-18 RX ORDER — HALOPERIDOL 5 MG
5 TABLET ORAL EVERY 6 HOURS PRN
Status: DISCONTINUED | OUTPATIENT
Start: 2021-09-18 | End: 2021-09-23 | Stop reason: HOSPADM

## 2021-09-18 RX ORDER — LORAZEPAM 1 MG/1
2 TABLET ORAL EVERY 6 HOURS PRN
Status: DISCONTINUED | OUTPATIENT
Start: 2021-09-18 | End: 2021-09-23 | Stop reason: HOSPADM

## 2021-09-18 RX ORDER — LORAZEPAM 2 MG/ML
2 INJECTION INTRAMUSCULAR EVERY 6 HOURS PRN
Status: DISCONTINUED | OUTPATIENT
Start: 2021-09-18 | End: 2021-09-23 | Stop reason: HOSPADM

## 2021-09-18 RX ORDER — MAGNESIUM HYDROXIDE/ALUMINUM HYDROXICE/SIMETHICONE 120; 1200; 1200 MG/30ML; MG/30ML; MG/30ML
30 SUSPENSION ORAL EVERY 6 HOURS PRN
Status: DISCONTINUED | OUTPATIENT
Start: 2021-09-18 | End: 2021-09-23 | Stop reason: HOSPADM

## 2021-09-18 RX ORDER — IBUPROFEN 400 MG/1
400 TABLET ORAL EVERY 6 HOURS PRN
Status: DISCONTINUED | OUTPATIENT
Start: 2021-09-18 | End: 2021-09-23 | Stop reason: HOSPADM

## 2021-09-18 RX ORDER — DIPHENHYDRAMINE HYDROCHLORIDE 50 MG/ML
50 INJECTION INTRAMUSCULAR; INTRAVENOUS EVERY 6 HOURS PRN
Status: DISCONTINUED | OUTPATIENT
Start: 2021-09-18 | End: 2021-09-23 | Stop reason: HOSPADM

## 2021-09-18 RX ADMIN — PALIPERIDONE 3 MG: 3 TABLET, EXTENDED RELEASE ORAL at 16:37

## 2021-09-18 ASSESSMENT — LIFESTYLE VARIABLES: HISTORY_ALCOHOL_USE: COMMENT

## 2021-09-18 NOTE — CARE COORDINATION
Discharge Arrangements: Unknown    Guardian notified:  NA    Discharge destination/address: [address] [place] [address]    Transported by:  Unknown at this time

## 2021-09-18 NOTE — PROGRESS NOTES
Behavioral Services  Medicare Certification Upon Admission    I certify that this patient's inpatient psychiatric hospital admission is medically necessary for:    [x] (1) Treatment which could reasonably be expected to improve this patient's condition,       [x] (2) Or for diagnostic study;     AND     [x](2) The inpatient psychiatric services are provided while the individual is under the care of a physician and are included in the individualized plan of care.     Estimated length of stay/service 4 to 7 days    Plan for post-hospital care Home with outpatient follow-up    Electronically signed by Boubacar Jurado MD on 9/18/2021 at 5:20 PM

## 2021-09-18 NOTE — CARE COORDINATION
SHAYAN received call from Kerri at the [facility] regarding pt. Kerri will discuss pt with shelter manager on Monday regarding the disposition of pt and call SW back on Monday. This SW gave co-workers extension for contact also.
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
## 2021-09-18 NOTE — BH NOTE
[facility]  Admission Note     Admission Type:   Admission Type: Involuntary    Reason for admission:  Reason for Admission: Patient involuntary from [facility] ED with flight of ideas, paranoid and barocaded self in room at [facility].     PATIENT STRENGTHS:  Strengths: Communication, No significant Physical Illness    Patient Strengths and Limitations:  Limitations: Difficult relationships / poor social skills, General negative or hopeless attitude about future/recovery, Hopeless about future    Addictive Behavior:   Addictive Behavior  In the past 3 months, have you felt or has someone told you that you have a problem with:  :  (ROSHAN)  Do you have a history of Chemical Use?: Comment (ROSHAN)  Do you have a history of Alcohol Use?: Comment (ROSHAN)  Do you have a history of Street Drug Abuse?: Comment (ROSHAN)  Histroy of Prescripton Drug Abuse?: Comment (ROSHAN)    Medical Problems:   Past Medical History:   Diagnosis Date    Schizophrenia ([facility] [address]     reports recent diagnosis in Dec 2018, not on meds currently        Status EXAM:  Status and Exam  Normal: No  Facial Expression: Flat, Worried  Affect: Unstable  Level of Consciousness: Alert  Mood:Normal: No  Mood: Depressed, Suspicious, Irritable  Motor Activity:Normal: Yes  Interview Behavior: Cooperative  Preception: [place] to Person  Attention:Normal: No  Attention: Unable to Concentrate, Hyperalert  Thought Processes: Blocking, Flt.of Ideas  Thought Content:Normal: No  Thought Content: Paranoia, Delusions  Hallucinations: Unable to assess  Delusions: Yes  Delusions: Persecution  Memory:Normal: No  Memory: Confabulation  Insight and Judgment: No  Insight and Judgment: Poor Judgment, Poor Insight  Present Suicidal Ideation: Other(See comment) (ROSHAN)  Present Homicidal Ideation: Other(See comment) (ROSHAN)    Tobacco Screening:  Practical Counseling, on admission, dontae X, if applicable and completed (first 3 are required if patient doesn't refuse):

## 2021-09-18 NOTE — PLAN OF CARE
Problem: Altered Mood, Deterioration in Function:  Goal: Able to verbalize reality based thinking  Description: Able to verbalize reality based thinking  9/18/2021 1716 by JATIN Bhakta  Outcome: Ongoing  Note: Patient refused assessment with writer. Patient slept most of shift, didn't eat meal or interact with others. Problem: Altered Mood, Manic Behavior:  Goal: Able to verbalize decrease in frequency and intensity of racing thoughts  Description: Able to verbalize decrease in frequency and intensity of racing thoughts  9/18/2021 1716 by JATIN Bhakta  Outcome: Ongoing     Problem: Altered Mood, Manic Behavior:  Goal: Absence of self-harm  Description: Absence of self-harm  9/18/2021 1716 by JATIN Bhakta  Outcome: Ongoing  Note: Patient is free from harm and denies plan to harm self. Q15 maintained for safety.

## 2021-09-18 NOTE — H&P
Department of Psychiatry  Attending Physician Psychiatric Assessment     Reason for Admission to Psychiatric Unit:  Concerns about patient's safety in the community    CHIEF COMPLAINT: Acute psychosis    History obtained from: Patient, electronic medical record          HISTORY OF PRESENT ILLNESS:    Hernandez Dubois is a 35 y.o. female who has a past medical history of mental illness who presented to the [facility] ER by [employer]PD for barricading herself in her room at her shelter. Per ED records from [facility] patient was placed on a pink slip which stated, \" patient is at imminent risk of harm to self/others as evidenced by paranoid delusions, auditory and visual hallucinations, pacing, yelling, arguing with voices. \"  Patient was noted to be barricading herself in her room at the [facility] and reportedly had a taser with her. Per ED records patient was paranoid and noted to be arguing with voices she was barricaded in her room. Patient did receive IM injections for agitation in the ER. Upon assessment today patient is lying in bed and is somnolent but responds to verbal stimuli. Patient reports \"this is a misunderstanding, I do not think I should be here. 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 \"  She reports that yesterday she was \"with my clients on the phone and they must of been talking loudly and they got concerned for my safety. \"  She reports that she was doing her job yesterday and minding her own business in her room. Patient states \"I was on the clock. \"  Patient is very tangential in her answers. She presents as disorganized and hyperverbal.  She is experiencing flight of ideas. Patient reports that she has not slept for the past \"2 days. \"  She states that her energy level has been increased. Patient denies of symptoms of depression including poor concentration, low mood, low energy, poor appetite or decreased interest in things.   Patient does report that she has not been sleeping for the past 2 days, she has grandiose thoughts of herself believing that she is a psychic medium, and has been experiencing psychosis per ED records. Patient does endorse excess worry, restlessness, and muscle tension from being anxious all the time. She denies panic attacks. She denies obsessive-compulsive behaviors or thoughts. She denies past history of abuse. Patient does report that she struggles with nightmares but is unable to give details whether or not these are nightmares of things that happened during her past.    Patient reports that she has not been compliant with medications and does not take any medications currently. She states \"I was here maybe a couple years ago and they started me on some medications but I do not take them. \"  Patient denies illicit drug use. Patient states \"I drink socially\" but does not report a significant problem with alcohol use. History of head trauma: [] Yes [x] No    History of seizures: [] Yes [x] No    History of violence or aggression: [x] Yes [] No         PSYCHIATRIC HISTORY:  [x] Yes [] No    Currently follows with \"nobody. \"    No previous lifetime suicide attempts. Previous psychiatric hospital admissions. Admitted to [facility] 1/10/2020. Past psychiatric medications includes: Patient was discharged from this facility in 2020 with Trileptal, Latuda, and Minipress. Patient reports that she has not taken these medications since discharge.     Adverse reactions from psychotropic medications: [] Yes [x] No         Lifetime Psychiatric Review of Systems         Depression: Denies     Anxiety: Endorses     Panic Attacks: Denies     Kiki or Hypomania: Endorses with symptoms of no sleep over the past couple of days and grandiose thoughts of self     Phobias: Denies     Obsessions and Compulsions: Denies     Visual Hallucinations: Denies but was noted to be responding to internal stimuli prior to hospitalization Auditory Hallucinations: Denies but was noted to be responding to internal stimuli prior to hospitalization     Delusions: Endorses     Paranoia: Denies     PTSD: Denies    Past Medical History:        Diagnosis Date    Schizophrenia [facility])     reports recent diagnosis in Dec 2018, not on meds currently        Past Surgical History:    No past surgical history on file. Allergies:  Patient has no known allergies. Social History:     Born in: [address] moved to [place] as an adult  Family: Patient reports that she was raised by her mother but that both of her parents are currently . Patient reports she has 2 brothers whom she does not have relationships with. Highest Level of Education: Patient reports I have a masters degree in education  Occupation: Not currently employed  Marital Status: Never   Children: No children  Residence: Lives at [facility] and has been there for about a year she states  Stressors: symptoms of psychosis  Patient Assets/Supportive Factors: Patient is seeking additional support, stable housing         DRUG USE HISTORY  Social History     Tobacco Use   Smoking Status Never Smoker   Smokeless Tobacco Never Used   Tobacco Comment    pt non smoker     Social History     Substance and Sexual Activity   Alcohol Use Never     Social History     Substance and Sexual Activity   Drug Use Yes    Types: Marijuana      Patient denies illicit drug use. Patient states \"I drink socially\" but does not report a significant problem with alcohol use. No labs or UDS were obtained on admission. LEGAL HISTORY:   HISTORY OF INCARCERATION: [x] Yes [] No    Family History:       Family history unknown: Yes       Psychiatric Family History    Patient denies psychiatric family history.      Suicides in family: [] Yes [x] No    Substance use in family: [] Yes [x] No         PHYSICAL EXAM:  Vitals:  /61   Pulse 77   Temp 98 °F (36.7 °C) (Temporal)   Resp 14     LABS:  No labs were obtained on admission to [facility] ER. Will get baseline labs. Review of Systems   Constitutional: Negative for chills and weight loss. HENT: Negative for ear pain and nosebleeds. Eyes: Negative for blurred vision and photophobia. Respiratory: Negative for cough, shortness of breath and wheezing. Cardiovascular: Negative for chest pain and palpitations. Gastrointestinal: Negative for abdominal pain, diarrhea and vomiting. Genitourinary: Negative for dysuria and urgency. Musculoskeletal: Negative for falls and joint pain. Skin: Negative for itching and rash. Neurological: Negative for tremors, seizures and weakness. Endo/Heme/Allergies: Does not bruise/bleed easily. Physical Exam:   Constitutional:  Appears well-developed and well-nourished, no acute distress. HENT:   Head: Normocephalic and atraumatic. Eyes: Conjunctivae are normal. Right eye exhibits no discharge. Left eye exhibits no discharge. No scleral icterus. Neck: Normal range of motion. Neck supple. Pulmonary/Chest:  No respiratory distress or accessory muscle use, no wheezing. Cardiac: Regular rate and rhythm. Abdominal: Soft. Non-tender. Exhibits no distension. Musculoskeletal: Normal range of motion. Exhibits no edema. Neurological: cranial nerves II-XII grossly in tact, normal gait and station. Skin: Skin is warm and dry. Patient is not diaphoretic. No erythema. Mental Status Examination:    Level of consciousness:  Somnolent but responds to verbal stimuli  Appearance:  Appropriate attire, resting in bed, poor grooming , disheveled  Behavior/Motor: Approachable, psychomotor slowing, somnolence  Attitude toward examiner:  Cooperative, semi-attentive, poor eye contact, evasive, guarded  Speech: Normal rate, low volume, and tone.   Mood: \"okay\"  Affect: Guarded   Thought processes: coherent, illogical and tangential  Thought content: Denies suicidal ideations, without current plan or intent, contracts for safety on the unit. Denies homicidal ideations               Denies visual hallucinations. Denies auditory hallucinations. Patient was noted to be responding to internal stimuli prior to hospitalization. Endorses delusions              Denies paranoia but appears paranoid  Cognition:  Oriented to self, location, time, situation  Concentration: Clinically adequate  Memory: Intact  Insight &Judgment: Poor         DSM-5 Diagnosis    Principal Problem: Acute psychosis ([facility]    Generalized Anxiety Disorder    Psychosocial and Contextual factors:  Financial: Denies  Occupational: Denies  Relationship: Denies  Legal: Denies  Living situation: Denies  Educational: Denies    Past Medical History:   Diagnosis Date    Schizophrenia ([facility]     reports recent diagnosis in Dec 2018, not on meds currently         TREATMENT PLAN    Continue inpatient psychiatric treatment. Home medications reviewed, patient reports she has not been compliant with home medications. Will start Invega 3 mg   Problem list updated. Monitor need and frequency of PRN medications. Attempt to develop insight. Follow-up daily while inpatient. Reviewed risks and benefits as well as potential side effects with patient. CONSULTS [] Yes [x] No      Risk Management: close watch per standard protocol      Psychotherapy: participation in milieu and group and individual sessions with Attending Physician,  and Physician Assistant/CNP      Estimated length of stay:  2-14 days      GENERAL PATIENT/FAMILY EDUCATION  Patient will understand basic signs and symptoms, patient will understand benefits/risks and potential side effects from proposed medications, and patient will understand their role in recovery. Family is not active in patient's care.    Patient assets that may be helpful during treatment include: Intent to participate and engage in treatment, sufficient fund of knowledge and intellect to understand and utilize treatments. Goals:    1) Remission of psychosis. 2) Stabilization of symptoms prior to discharge. 3) Establish efficacy and tolerability of medications. Behavioral Services  Medicare Certification     Admission Day 1  I certify that this patient's inpatient psychiatric hospital admission is medically necessary for:    x (1) treatment which could reasonably be expected to improve this patient's condition, or    x (2) diagnostic study or its equivalent. Time Spent: 60 minutes    Sanket Butterfield is a 35 y.o. female being evaluated face to face    --REYNALDO Jean CNP on 9/18/2021 at 10:52 AM    An electronic signature was used to authenticate this note. I independently saw and evaluated the patient. I reviewed the nurse practitioners documentation above. Any additional comments or changes to the nurse practitioners documentation are stated below otherwise agree with assessment. Plan will be as follows:  Upon further exploration of the patient's symptoms it does become clear that she is paranoid and delusional.  She had a list of demands such as everyone at her shelter should provide photocopies of all of their IDs so that she could know who they were. She states she has been living there for months but was questioning whether people were \"impostors\". She feels that there may be impostors impersonating some of the other staff members. This could be a dangerous situation with a Capgras-like syndrome. Patient indicated that she communicates with the dad and community with spirits and that when they take over her body she has no control over what is happening. 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 She has no insight or understanding that conducting such business in a shelter with her yelling and screaming would create problems for others.   She is paranoid that people who were there yesterday were not who they were releasing they were. Is unclear whether patient will be compliant with her medications. Will observe on the unit and encourage compliance with medication.   Electronically signed by Ayah Lopez MD on 9/18/2021 at 5:23 PM

## 2021-09-18 NOTE — PLAN OF CARE
[facility]  Initial Interdisciplinary Treatment Plan NO      Original treatment plan Date & Time: 9/18/21 0903    Admission Type:  Admission Type: Involuntary    Reason for admission:   Reason for Admission: Patient involuntary from [facility] ED with flight of ideas, paranoid and barocaded self in room at [facility].     Estimated Length of Stay:  5-7days  Estimated Discharge Date: to be determined by physician    PATIENT STRENGTHS:  Patient Strengths:Strengths: Communication, No significant Physical Illness  Patient Strengths and Limitations:Limitations: Difficult relationships / poor social skills, General negative or hopeless attitude about future/recovery, Hopeless about future  Addictive Behavior: Addictive Behavior  In the past 3 months, have you felt or has someone told you that you have a problem with:  :  (ROSHAN)  Do you have a history of Chemical Use?: Comment (ROSHAN)  Do you have a history of Alcohol Use?: Comment (ROSHAN)  Do you have a history of Street Drug Abuse?: Comment (ROSHAN)  Histroy of Prescripton Drug Abuse?: Comment (ROSHAN)  Medical Problems:  Past Medical History:   Diagnosis Date    Schizophrenia ([facility]     reports recent diagnosis in Dec 2018, not on meds currently      Status EXAM:Status and Exam  Normal: No  Facial Expression: Flat, Worried  Affect: Unstable  Level of Consciousness: Alert  Mood:Normal: No  Mood: Depressed, Suspicious, Irritable  Motor Activity:Normal: Yes  Interview Behavior: Cooperative  Preception: [place] to Person  Attention:Normal: No  Attention: Unable to Concentrate, Hyperalert  Thought Processes: Blocking, Flt.of Ideas  Thought Content:Normal: No  Thought Content: Paranoia, Delusions  Hallucinations: Unable to assess  Delusions: Yes  Delusions: Persecution  Memory:Normal: No  Memory: Confabulation  Insight and Judgment: No  Insight and Judgment: Poor Judgment, Poor Insight  Present Suicidal Ideation: Other(See comment) (ROSHAN)  Present Homicidal Ideation: Other(See comment) (ROSHAN)    EDUCATION:   Learner Progress Toward Treatment Goals: reviewed group plans and strategies for care    Method:group therapy, medication compliance, individualized assessments and care planning    Outcome: needs reinforcement    PATIENT GOALS: to be discussed with patient within 72 hours    PLAN/TREATMENT RECOMMENDATIONS:     continue group therapy , medications compliance, goal setting, individualized assessments and care, continue to monitor pt on unit      SHORT-TERM GOALS:   Time frame for Short-Term Goals: 5-7 days    LONG-TERM GOALS:  Time frame for Long-Term Goals: 6 months  Members Present in Team Meeting: See Signature [address]

## 2021-09-19 PROCEDURE — 99232 SBSQ HOSP IP/OBS MODERATE 35: CPT | Performed by: PSYCHIATRY & NEUROLOGY

## 2021-09-19 PROCEDURE — 6370000000 HC RX 637 (ALT 250 FOR IP)

## 2021-09-19 PROCEDURE — 85025 COMPLETE CBC W/AUTO DIFF WBC: CPT

## 2021-09-19 PROCEDURE — APPSS30 APP SPLIT SHARED TIME 16-30 MINUTES

## 2021-09-19 PROCEDURE — 1240000000 HC EMOTIONAL WELLNESS R&B

## 2021-09-19 PROCEDURE — 80053 COMPREHEN METABOLIC PANEL: CPT

## 2021-09-19 PROCEDURE — 6370000000 HC RX 637 (ALT 250 FOR IP): Performed by: NURSE PRACTITIONER

## 2021-09-19 RX ORDER — PALIPERIDONE 6 MG/1
6 TABLET, EXTENDED RELEASE ORAL DAILY
Status: DISCONTINUED | OUTPATIENT
Start: 2021-09-20 | End: 2021-09-21

## 2021-09-19 RX ADMIN — TRAZODONE HYDROCHLORIDE 50 MG: 50 TABLET ORAL at 21:29

## 2021-09-19 RX ADMIN — PALIPERIDONE 3 MG: 3 TABLET, EXTENDED RELEASE ORAL at 14:14
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
## 2021-09-19 NOTE — PLAN OF CARE
Patient was seclusive to her room for most of the evening. Patient was guarded and evasive. Patient appears to have some thought blocking and appears preoccupied. Patient states she has been eating and sleeping okay. Patient denied hallucinations. Patient denies any suicidal thoughts at this time. Patient agrees to come talk with staff if having any thoughts to harm herself this shift. 15 min rounds continued for patient safety. Problem: Altered Mood, Deterioration in Function:  Goal: Able to verbalize reality based thinking  Description: Able to verbalize reality based thinking  9/19/2021 0049 by Joaquin Louis RN  Outcome: Ongoing  9/18/2021 1716 by JATIN Bhakta  Outcome: Ongoing  Note: Patient refused assessment with writer. Patient slept most of shift, didn't eat meal or interact with others. 9/18/2021 1716 by JATIN Bhakta  Outcome: Ongoing     Problem: Altered Mood, Manic Behavior:  Goal: Able to verbalize decrease in frequency and intensity of racing thoughts  Description: Able to verbalize decrease in frequency and intensity of racing thoughts  9/19/2021 0049 by Joaquin Louis RN  Outcome: Ongoing  9/18/2021 1716 by JATIN Bhakta  Outcome: Ongoing  9/18/2021 1716 by JATIN Bhakta  Outcome: Ongoing  Goal: Absence of self-harm  Description: Absence of self-harm  9/19/2021 0049 by Joaquin Louis RN  Outcome: Ongoing  9/18/2021 1716 by JATIN Bhakta  Outcome: Ongoing  Note: Patient is free from harm and denies plan to harm self. Q15 maintained for safety.   9/18/2021 1716 by JATIN Bhakta  Outcome: Ongoing

## 2021-09-19 NOTE — PLAN OF CARE
Problem: Altered Mood, Deterioration in Function:  Goal: Able to verbalize reality based thinking  Description: Able to verbalize reality based thinking  9/19/2021 1415 by Leopold Hedges, CTRS  Outcome: Ongoing  Note: Patient denies depression, hallucinations, suicidal and homicidal ideations. Patient reports sleeping a lot and feels lethargic throughout the day. Patient does admit to poor appetite. Patient provides minimal responses to assessment and is seen in her bed. Patient slept for most of shift. Patient is cooperative and pleasant upon approach. Patient is medication compliant and behavioral controlled. Patient safety maintained and 15 minute checks continues.      Problem: Altered Mood, Manic Behavior:  Goal: Able to verbalize decrease in frequency and intensity of racing thoughts  Description: Able to verbalize decrease in frequency and intensity of racing thoughts  9/19/2021 1415 by Leopold Hedges, CTRS  Outcome: Ongoing     Problem: Altered Mood, Manic Behavior:  Goal: Absence of self-harm  Description: Absence of self-harm  9/19/2021 1415 by Leopold Hedges, CTRS  Outcome: Ongoing

## 2021-09-19 NOTE — PLAN OF CARE
[facility]  Day 3 Interdisciplinary Treatment Plan NOTE    Review Date & Time: 9/19/2021 1309    Admission Type:   Admission Type: Involuntary    Reason for admission:  Reason for Admission: Patient involuntary from [address] [facility] ED with flight of ideas, paranoid and barocaded self in room at [facility].   Estimated Length of Stay: 5-7 days  Estimated Discharge Date Update: to be determined by physician    PATIENT STRENGTHS:  Patient Strengths Strengths: Communication, No significant Physical Illness  Patient Strengths and Limitations:Limitations: Tendency to isolate self, Difficulty problem solving/relies on others to help solve problems  Addictive Behavior:Addictive Behavior  In the past 3 months, have you felt or has someone told you that you have a problem with:  :  (ROSHAN)  Do you have a history of Chemical Use?: Comment (ROSHAN)  Do you have a history of Alcohol Use?: Comment (ROSHAN)  Do you have a history of Street Drug Abuse?: Comment (ROSHAN)  Histroy of Prescripton Drug Abuse?: Comment (ROSHAN)  Medical Problems:  Past Medical History:   Diagnosis Date    Schizophrenia ([facility]     reports recent diagnosis in Dec 2018, not on meds currently        Risk:  Fall RiskTotal: 69  Reese Scale Reese Scale Score: 22  BVC Total: 0  Change in scores no Changes to plan of Care no    Status EXAM:   Status and Exam  Normal: No  Facial Expression: Avoids Gaze  Affect: Blunt  Level of Consciousness: Alert  Mood:Normal: No  Mood: Anxious, Depressed  Motor Activity:Normal: No  Motor Activity: Decreased  Interview Behavior: Cooperative  Preception: [place] to Person, [place] to Time, [place] to Place  Attention:Normal: No  Attention: Distractible  Thought Processes: Blocking  Thought Content:Normal: No  Thought Content: Preoccupations  Hallucinations: None  Delusions: Yes  Delusions: Persecution  Memory:Normal: Yes  Memory: Poor Remote  Insight and Judgment: No  Insight and Judgment: Poor Insight, Poor Judgment  Present Suicidal Ideation: No  Present Homicidal Ideation: No    Daily Assessment Last Entry:   Daily Sleep (WDL): Within Defined Limits         Patient Currently in Pain: No  Daily Nutrition (WDL): Exceptions to WDL  Appetite Change: Decreased  Level of Assistance: Independent/Self    Patient Monitoring:  Frequency of Checks: 4 times per hour, close    Psychiatric Symptoms:   Depression Symptoms  Depression Symptoms: Feelings of hopelessess, Isolative  Anxiety Symptoms  Anxiety Symptoms: Generalized  Kiki Symptoms  Kiki Symptoms: No problems reported or observed.      Psychosis Symptoms  Delusion Type:  (Unable to assess)    Suicide Risk CSSR-S:  1) Within the past month, have you wished you were dead or wished you could go to sleep and not wake up? : No  2) Have you actually had any thoughts of killing yourself? : No  6) Have you ever done anything, started to do anything, or prepared to do anything to end your life?: No  Change in Result  NA  Change in Plan of care  NA       EDUCATION:   EDUCATION:   Learner Progress Toward Treatment Goals: Reviewed results and recommendations of this team, Reviewed group plan and strategies, Reviewed signs, symptoms and risk of self harm and violent behavior, Reviewed goals and plan of care    Method:small group, individual verbal education    Outcome:verbalized by patient, but needs reinforcement to obtain goals    PATIENT GOALS:  Short term: Patient declined to attend   Long term:    PLAN/TREATMENT RECOMMENDATIONS UPDATE: continue with group therapies, increased socialization, continue planning for after discharge goals, continue with medication compliance    SHORT-TERM GOALS UPDATE:   Time frame for Short-Term Goals: 5-7 days    LONG-TERM GOALS UPDATE:   Time frame for Long-Term Goals: 6 months  Members Present in Team Meeting: See Signature Sheet    Yousif Gonzáles

## 2021-09-19 NOTE — GROUP NOTE
Group Therapy Note    Date: 9/19/2021    Group Start Time: 9924  Group End Time: 0935  Group Topic: [id number] [facility]    Patient refused to attend community meeting and goal setting group at 2374 after encouragement from staff. 1:1 talk time offered by staff as alternative to group session.

## 2021-09-19 NOTE — CARE COORDINATION
Patient declined to attend Psychoeducation group at 10:07 am despite encouragement. Patient was offered a 1:1 time to meet after group or alternative activity to do and patient refused.

## 2021-09-19 NOTE — GROUP NOTE
Group Therapy Note    Date: 9/19/2021    Group Start Time: 1400  Group End Time: 3824  Group Topic: Psychoeducation    JULES Clarke      Patient declined to attend coping skills group at 1400 despite encouragement from staff. 1:1 talk time offered by staff as alternative to group session.       Signature:  Ingrid Ulloa

## 2021-09-20 LAB
ABSOLUTE EOS #: 0 K/UL (ref 0–0.4)
ABSOLUTE IMMATURE GRANULOCYTE: ABNORMAL K/UL (ref 0–0.3)
ABSOLUTE LYMPH #: 1.7 K/UL (ref 1–4.8)
ABSOLUTE MONO #: 0.5 K/UL (ref 0.1–1.3)
ALBUMIN SERPL-MCNC: 4.4 G/DL (ref 3.5–5.2)
ALBUMIN/GLOBULIN RATIO: ABNORMAL (ref 1–2.5)
ALP BLD-CCNC: 74 U/L (ref 35–104)
ALT SERPL-CCNC: 11 U/L (ref 5–33)
ANION GAP SERPL CALCULATED.3IONS-SCNC: 11 MMOL/L (ref 9–17)
AST SERPL-CCNC: 22 U/L
BASOPHILS # BLD: 1 % (ref 0–2)
BASOPHILS ABSOLUTE: 0.1 K/UL (ref 0–0.2)
BILIRUB SERPL-MCNC: 0.47 MG/DL (ref 0.3–1.2)
BUN BLDV-MCNC: 14 MG/DL (ref 6–20)
BUN/CREAT BLD: ABNORMAL (ref 9–20)
CALCIUM SERPL-MCNC: 9.7 MG/DL (ref 8.6–10.4)
CHLORIDE BLD-SCNC: 101 MMOL/L (ref 98–107)
CO2: 25 MMOL/L (ref 20–31)
CREAT SERPL-MCNC: 0.81 MG/DL (ref 0.5–0.9)
DIFFERENTIAL TYPE: ABNORMAL
EOSINOPHILS RELATIVE PERCENT: 0 % (ref 0–4)
GFR AFRICAN AMERICAN: >60 ML/MIN
GFR NON-AFRICAN AMERICAN: >60 ML/MIN
GFR SERPL CREATININE-BSD FRML MDRD: ABNORMAL ML/MIN/{1.73_M2}
GFR SERPL CREATININE-BSD FRML MDRD: ABNORMAL ML/MIN/{1.73_M2}
GLUCOSE BLD-MCNC: 104 MG/DL (ref 70–99)
HCT VFR BLD CALC: 39.9 % (ref 36–46)
HEMOGLOBIN: 13.2 G/DL (ref 12–16)
IMMATURE GRANULOCYTES: ABNORMAL %
LYMPHOCYTES # BLD: 19 % (ref 24–44)
MCH RBC QN AUTO: 29.9 PG (ref 26–34)
MCHC RBC AUTO-ENTMCNC: 33 G/DL (ref 31–37)
MCV RBC AUTO: 90.7 FL (ref 80–100)
MONOCYTES # BLD: 6 % (ref 1–7)
NRBC AUTOMATED: ABNORMAL PER 100 WBC
PDW BLD-RTO: 13.7 % (ref 11.5–14.9)
PLATELET # BLD: 229 K/UL (ref 150–450)
PLATELET ESTIMATE: ABNORMAL
PMV BLD AUTO: 8.2 FL (ref 6–12)
POTASSIUM SERPL-SCNC: 4 MMOL/L (ref 3.7–5.3)
RBC # BLD: 4.4 M/UL (ref 4–5.2)
RBC # BLD: ABNORMAL 10*6/UL
SEG NEUTROPHILS: 74 % (ref 36–66)
SEGMENTED NEUTROPHILS ABSOLUTE COUNT: 6.3 K/UL (ref 1.3–9.1)
SODIUM BLD-SCNC: 137 MMOL/L (ref 135–144)
TOTAL PROTEIN: 7.9 G/DL (ref 6.4–8.3)
WBC # BLD: 8.6 K/UL (ref 3.5–11)
WBC # BLD: ABNORMAL 10*3/UL

## 2021-09-20 PROCEDURE — APPSS30 APP SPLIT SHARED TIME 16-30 MINUTES

## 2021-09-20 PROCEDURE — 90833 PSYTX W PT W E/M 30 MIN: CPT | Performed by: PSYCHIATRY & NEUROLOGY

## 2021-09-20 PROCEDURE — 1240000000 HC EMOTIONAL WELLNESS R&B

## 2021-09-20 PROCEDURE — 99232 SBSQ HOSP IP/OBS MODERATE 35: CPT | Performed by: PSYCHIATRY & NEUROLOGY

## 2021-09-20 PROCEDURE — 6370000000 HC RX 637 (ALT 250 FOR IP): Performed by: NURSE PRACTITIONER

## 2021-09-20 PROCEDURE — 6370000000 HC RX 637 (ALT 250 FOR IP)

## 2021-09-20 RX ADMIN — HYDROXYZINE HYDROCHLORIDE 50 MG: 50 TABLET ORAL at 21:03

## 2021-09-20 RX ADMIN — PALIPERIDONE 6 MG: 6 TABLET, EXTENDED RELEASE ORAL at 08:31
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
## 2021-09-20 NOTE — GROUP NOTE
Group Therapy Note    Date: 9/20/2021    Group Start Time: 1000  Group End Time: 7370  Group Topic: Group Therapy    NIKO Clay LSW        Group Therapy Note  Pt declined to attend psychotherapy at 1000 am despite encouragement. Pt offered 1:1 and refused.       Attendees: 10/22         Signature:  NIKO Lloyd LSW

## 2021-09-20 NOTE — PLAN OF CARE
Patient has been seclusive to her room for most of the evening. Patient is guarded and evasive during talk time. Patient appears preoccupied and somewhat paranoid. Patient took medication for sleep tonight. Patient denies any suicidal thoughts at this time. Patient agrees to come talk with staff if having any thoughts to harm herself this shift. 15 min rounds continued for patient safety. Problem: Altered Mood, Deterioration in Function:  Goal: Able to verbalize reality based thinking  Description: Able to verbalize reality based thinking  9/20/2021 0211 by Nilson Bowen RN  Outcome: Ongoing  9/19/2021 1415 by JATIN Calloway  Outcome: Ongoing  Note: Patient denies depression, hallucinations, suicidal and homicidal ideations. Patient reports sleeping a lot and feels lethargic throughout the day. Patient does admit to poor appetite. Patient provides minimal responses to assessment and is seen in her bed. Patient slept for most of shift. Patient is cooperative and pleasant upon approach. Patient is medication compliant and behavioral controlled. Patient safety maintained and 15 minute checks continues.   9/19/2021 1308 by Joshua Burrell  Outcome: Ongoing     Problem: Altered Mood, Manic Behavior:  Goal: Able to verbalize decrease in frequency and intensity of racing thoughts  Description: Able to verbalize decrease in frequency and intensity of racing thoughts  9/20/2021 0211 by Nilson Bowen RN  Outcome: Ongoing  9/19/2021 1415 by JATIN Calloway  Outcome: Ongoing  9/19/2021 1308 by Joshua Burrell  Outcome: Ongoing  Goal: Absence of self-harm  Description: Absence of self-harm  9/20/2021 0211 by Nilson Bowen RN  Outcome: Ongoing  9/19/2021 1415 by JATIN Calloway  Outcome: Ongoing  9/19/2021 1308 by Joshua Burrell  Outcome: Ongoing

## 2021-09-20 NOTE — PLAN OF CARE
Problem: Altered Mood, Deterioration in Function:  Goal: Able to verbalize reality based thinking  Description: Able to verbalize reality based thinking  9/20/2021 1154 by Jessie Bagley RN  Outcome: Ongoing  Patient is able to display reality based thinking. Patient understands that their poor reality based thinking may inhibit them from making proper decisions. Problem: Altered Mood, Manic Behavior:  Goal: Able to verbalize decrease in frequency and intensity of racing thoughts  Description: Able to verbalize decrease in frequency and intensity of racing thoughts  9/20/2021 1154 by Jessie Bagley RN  Outcome: Ongoing  Patient denies any intense racing thoughts currently. Patient states that they feel under control at this time. Problem: Altered Mood, Manic Behavior:  Goal: Absence of self-harm  Description: Absence of self-harm  9/20/2021 1154 by Jessie Bagley RN  Outcome: Ongoing  Patient has remained free from self-harm during admission. Patient does not feel the need to harm themselves. If these types of thoughts arise, patient will notify a staff member. Q15 Minute checks in place for patient safety.

## 2021-09-20 NOTE — BH NOTE
EDDY nurse came to assess patient at 26. SANE kit completed by SANE nurse. Writer communicated with Diane Valentino for direct transfer of SANE kit to [facility]. [facility] has patients clothes and other items currently.       TIGREE Documentation for Patient located in Patients Chart

## 2021-09-20 NOTE — GROUP NOTE
Group Therapy Note    Date: 9/20/2021    Group Start Time: 1330  Group End Time: 1430  Group Topic: Cognitive Skills    CARROLL RandolphS        Group Therapy Note    Attendees:8/22         Patient's Goal:  Develop effective communication skills      Status After Intervention:  Improved    Participation Level:  Active Listener and Interactive    Participation Quality: Appropriate, Attentive, Sharing and Supportive      Speech:  normal      Thought Process/Content: Logical      Affective Functioning: Congruent      Mood: euphoric      Level of consciousness:  Alert, Oriented x4 and Attentive      Response to Learning: Able to verbalize current knowledge/experience, Able to verbalize/acknowledge new learning, Able to retain information and Capable of insight      Modes of Intervention: Education, Support, Socialization, Exploration, Clarifying and Problem-solving      Discipline Responsible: Psychoeducational Specialist      Signature:  Angel López

## 2021-09-20 NOTE — GROUP NOTE
Group Therapy Note    Date: 9/20/2021    Group Start Time: 1055  Group End Time: 1150  Group Topic: Recreational    [address] [facility] Therapy Note    Attendees: 7/21    patient refused to attend  leisure and recreation group at [id number] after encouragement from staff. 1:1 talk time provided as alternative to group session.          Signature:  Emerita Arriaza, [address]

## 2021-09-21 PROCEDURE — 6370000000 HC RX 637 (ALT 250 FOR IP): Performed by: NURSE PRACTITIONER

## 2021-09-21 PROCEDURE — 90833 PSYTX W PT W E/M 30 MIN: CPT | Performed by: PSYCHIATRY & NEUROLOGY

## 2021-09-21 PROCEDURE — 6370000000 HC RX 637 (ALT 250 FOR IP): Performed by: PSYCHIATRY & NEUROLOGY

## 2021-09-21 PROCEDURE — APPSS30 APP SPLIT SHARED TIME 16-30 MINUTES

## 2021-09-21 PROCEDURE — 99232 SBSQ HOSP IP/OBS MODERATE 35: CPT | Performed by: PSYCHIATRY & NEUROLOGY

## 2021-09-21 PROCEDURE — 1240000000 HC EMOTIONAL WELLNESS R&B

## 2021-09-21 RX ORDER — PALIPERIDONE 6 MG/1
6 TABLET, EXTENDED RELEASE ORAL NIGHTLY
Status: DISCONTINUED | OUTPATIENT
Start: 2021-09-21 | End: 2021-09-23 | Stop reason: HOSPADM

## 2021-09-21 RX ADMIN — HYDROXYZINE HYDROCHLORIDE 50 MG: 50 TABLET ORAL at 20:41

## 2021-09-21 RX ADMIN — HYDROXYZINE HYDROCHLORIDE 50 MG: 50 TABLET ORAL at 12:33

## 2021-09-21 RX ADMIN — PALIPERIDONE 6 MG: 6 TABLET, EXTENDED RELEASE ORAL at 20:41

## 2021-09-21 NOTE — GROUP NOTE
Group Therapy Note    Date: 9/21/2021    Group Start Time: 1100  Group End Time: 1150  Group Topic: Cognitive Skills    JULES Martinez, CTRS        Group Therapy Note    Attendees: 9/22    patient refused to attend  triggers to anxiety group at [id number] after encouragement from staff. 1:1 talk time provided as alternative to group session.             Signature:  Karla Martinez, [address]

## 2021-09-21 NOTE — GROUP NOTE
Group Therapy Note    Date: 9/21/2021    Group Start Time: 1330  Group End Time: 1430  Group Topic: Recreational    [address], [place]        Group Therapy Note    Attendees: 8/20        patient refused to attend recreation and leisure  group at [phone] after encouragement from staff. 1:1 talk time provided as alternative to group session.            Signature:  Kathy Osborn

## 2021-09-21 NOTE — PLAN OF CARE
Problem: Altered Mood, Deterioration in Function:  Goal: Able to verbalize reality based thinking  Description: Able to verbalize reality based thinking  Outcome: Ongoing  Patient denies all hallucinations at this time and verbalizes reality based thinking by participating in congruent conversation with staff and peers. Problem: Altered Mood, Manic Behavior:  Goal: Able to verbalize decrease in frequency and intensity of racing thoughts  Description: Able to verbalize decrease in frequency and intensity of racing thoughts  Outcome: Ongoing  Patient denies and does not present with symptoms of racing thoughts. Patient educated on relaxation skills and encouraged to seek staff if anxiety arises or persists. Problem: Altered Mood, Manic Behavior:  Goal: Absence of self-harm  Description: Absence of self-harm  Outcome: Ongoing  Safe environment maintained and patient   remains free from self-harm. Agreeable to seeking staff should thoughts to harm self or others arise. Q15min checks for safety.

## 2021-09-21 NOTE — PROGRESS NOTES
Daily Progress Note  9/21/2021    Patient Name: Sherlynn Klinefelter    CHIEF COMPLAINT: Acute psychosis         SUBJECTIVE:      Patient is seen today for a follow up assessment. Patient is compliant with scheduled Invega. Patient has not received emergency medications in the past 24 hours. Patient denies symptoms of depression today. Patient does endorse anxiety today and states \"my mind just does not feel normal.\"  She reports that she is curious if this is due to to the [place]. She reports that she is planning on taking a dose tonight instead of during the day to see if this helps. She endorses adequate restorative sleep and normal appetite. Patient denies suicidal and homicidal ideation. Patient is able to contract for safety on this unit with this writer. Patient denies auditory and visual hallucinations. When this writer asked if patient was still feeling paranoid about the staff at the shelter, the patient laughs and states \"oh gosh not anymore. \"  She states that she is feeling safe to return to the shelter and is happy that she came to the hospital to receive help. She states that she still feels \"a little weird\" but she is able to tell what is real from what is not real.  Patient will receive long-acting Invega Sustenna injection today and is agreeable to this. Patient denies medical concerns or medication side effects at this time. Writer encouraged patient to attend groups on the unit. . At this time, the patient is not appropriate for a lower level of care. There is risk of decompensation and patient warrants further hospitalization for safety and stabilization. Appetite: Improving    Sleep:       [x] Normal/Adequate/Unchanged  [] Fair  [] Poor      Group Attendance on Unit:   [] Yes  [x] Selectively    [] No    Medication Side Effects:  Patient denies any medication side effects at the time of assessment. Mental Status Exam  Level of consciousness: Alert and awake. Appearance: Appropriate attire for setting, resting in bed, with fair  grooming and hygiene  Behavior/Motor: Approachable, no psychomotor abnormalities. Attitude toward examiner: Cooperative, attentive, good eye contact. Speech: Normal rate, normal volume, normal tone. Mood:  Patient reports \"I'm good\". Affect: Anxious  Thought processes: Linear and coherent. Thought content: Denies homicidal ideation. Suicidal Ideation: Denies suicidal ideations, without current plan or intent, contracts for safety on the unit. Delusions: No evidence of delusions. Reports improvement in paranoia. Perceptual Disturbance: Patient does not appear to be responding to internal stimuli. Denies auditory hallucinations. Denies visual hallucinations. Cognition: Oriented to self, location, time, and situation. Memory: Intact. Insight & Judgement: Poor. Data   oral temperature is 97.8 °F (36.6 °C). Her blood pressure is 145/95 (abnormal) and her pulse is 104. Her respiration is 14.    Labs:   Admission on 09/18/2021   Component Date Value Ref Range Status    WBC 09/20/2021 8.6  3.5 - 11.0 k/uL Final    RBC 09/20/2021 4.40  4.0 - 5.2 m/uL Final    Hemoglobin 09/20/2021 13.2  12.0 - 16.0 g/dL Final    Hematocrit 09/20/2021 39.9  36 - 46 % Final    MCV 09/20/2021 90.7  80 - 100 fL Final    MCH 09/20/2021 29.9  26 - 34 pg Final    MCHC 09/20/2021 33.0  31 - 37 g/dL Final    RDW 09/20/2021 13.7  11.5 - 14.9 % Final    Platelets 35/18/4576 229  150 - 450 k/uL Final    MPV 09/20/2021 8.2  6.0 - 12.0 fL Final    NRBC Automated 09/20/2021 NOT REPORTED  per 100 WBC Final    Differential Type 09/20/2021 NOT REPORTED   Final    Seg Neutrophils 09/20/2021 74* 36 - 66 % Final    Lymphocytes 09/20/2021 19* 24 - 44 % Final    Monocytes 09/20/2021 6  1 - 7 % Final    Eosinophils % 09/20/2021 0  0 - 4 % Final    Basophils 09/20/2021 1  0 - 2 % Final    Immature Granulocytes 09/20/2021 NOT REPORTED  0 % Final    Segs Absolute 09/20/2021 6.30  1.3 - 9.1 k/uL Final    Absolute Lymph # 09/20/2021 1.70  1.0 - 4.8 k/uL Final    Absolute Mono # 09/20/2021 0.50  0.1 - 1.3 k/uL Final    Absolute Eos # 09/20/2021 0.00  0.0 - 0.4 k/uL Final    Basophils Absolute 09/20/2021 0.10  0.0 - 0.2 k/uL Final    Absolute Immature Granulocyte 09/20/2021 NOT REPORTED  0.00 - 0.30 k/uL Final    WBC Morphology 09/20/2021 NOT REPORTED   Final    RBC Morphology 09/20/2021 NOT REPORTED   Final    Platelet Estimate 69/15/8247 NOT REPORTED   Final    Glucose 09/20/2021 104* 70 - 99 mg/dL Final    BUN 09/20/2021 14  6 - 20 mg/dL Final    CREATININE 09/20/2021 0.81  0.50 - 0.90 mg/dL Final    Bun/Cre Ratio 09/20/2021 NOT REPORTED  9 - 20 Final    Calcium 09/20/2021 9.7  8.6 - 10.4 mg/dL Final    Sodium 09/20/2021 137  135 - 144 mmol/L Final    Potassium 09/20/2021 4.0  3.7 - 5.3 mmol/L Final    Chloride 09/20/2021 101  98 - 107 mmol/L Final    CO2 09/20/2021 25  20 - 31 mmol/L Final    Anion Gap 09/20/2021 11  9 - 17 mmol/L Final    Alkaline Phosphatase 09/20/2021 74  35 - 104 U/L Final    ALT 09/20/2021 11  5 - 33 U/L Final    AST 09/20/2021 22  <32 U/L Final    Total Bilirubin 09/20/2021 0.47  0.3 - 1.2 mg/dL Final    Total Protein 09/20/2021 7.9  6.4 - 8.3 g/dL Final    Albumin 09/20/2021 4.4  3.5 - 5.2 g/dL Final    Albumin/Globulin Ratio 09/20/2021 NOT REPORTED  1.0 - 2.5 Final    GFR Non- 09/20/2021 >60  >60 mL/min Final    GFR  09/20/2021 >60  >60 mL/min Final    GFR Comment 09/20/2021        Final    Comment: Average GFR for 20-28 years old:   80 mL/min/1.73sq m  Chronic Kidney Disease:   <60 mL/min/1.73sq m  Kidney failure:   <15 mL/min/1.73sq m              eGFR calculated using average adult body mass.  Additional eGFR calculator available at:        [email or web address]            GFR Staging 09/20/2021 NOT REPORTED   Final         Reviewed patient's current plan of care and vital signs with nursing staff. No labs have been obtained on this patient despite orders placed. Patient may have refused lab work but this has not been documented anywhere. Medications  Current Facility-Administered Medications: paliperidone (INVEGA) extended release tablet 6 mg, 6 mg, Oral, Nightly  paliperidone palmitate ER (INVEGA SUSTENNA) IM injection 234 mg, 234 mg, IntraMUSCular, Once  acetaminophen (TYLENOL) tablet 650 mg, 650 mg, Oral, Q4H PRN  aluminum & magnesium hydroxide-simethicone (MAALOX) 200-200-20 MG/5ML suspension 30 mL, 30 mL, Oral, Q6H PRN  hydrOXYzine (ATARAX) tablet 50 mg, 50 mg, Oral, TID PRN  ibuprofen (ADVIL;MOTRIN) tablet 400 mg, 400 mg, Oral, Q6H PRN  polyethylene glycol (GLYCOLAX) packet 17 g, 17 g, Oral, Daily PRN  traZODone (DESYREL) tablet 50 mg, 50 mg, Oral, Nightly PRN  haloperidol lactate (HALDOL) injection 5 mg, 5 mg, IntraMUSCular, Q6H PRN **AND** LORazepam (ATIVAN) injection 2 mg, 2 mg, IntraMUSCular, Q6H PRN **AND** diphenhydrAMINE (BENADRYL) injection 50 mg, 50 mg, IntraMUSCular, Q6H PRN  haloperidol (HALDOL) tablet 5 mg, 5 mg, Oral, Q6H PRN **AND** LORazepam (ATIVAN) tablet 2 mg, 2 mg, Oral, Q6H PRN  benzocaine-menthol (CEPACOL SORE THROAT) lozenge 1 lozenge, 1 lozenge, Oral, Q2H PRN    ASSESSMENT  Acute psychosis (HCC)         PLAN  Patient symptoms are: Improving. Continue current medication regimen. Administer Iris [place] injection today  Monitor need and frequency of PRN medications. Encourage participation in groups and milieu. Attempt to develop insight. Psycho-education conducted. Supportive Therapy conducted. Probable discharge is to be determined by MD.   Follow-up daily while inpatient. Patient continues to be monitored in the inpatient psychiatric facility at [facility] for safety and stabilization.  Patient continues to need, on a daily basis, active treatment furnished directly by or requiring the supervision of inpatient psychiatric personnel. Electronically signed by REYNALDO Drake CNP on 9/21/2021 at 3:06 PM    **This report has been created using voice recognition software. It may contain minor errors which are inherent in voice recognition technology. **    I independently saw and evaluated the patient. I reviewed the nurse practitioners documentation above. Any additional comments or changes to the nurse practitioners documentation are stated below otherwise agree with assessment. Plan will be as follows:  Patient denying side effects to medication. Reports improvement in her mood. Spent 30 minutes with the patient, of that greater than 16 minutes was spent in supportive psychotherapy. Discussed risk benefits and alternatives to treatment. Consider long-acting injectables. After our discussion patient was agreeable to long-acting injectable of [place]. PLAN  Patient s symptoms   are improving  Long-acting injectable of Invega Sustenna 234 mg IM today  Move oral Invega to nighttime secondary to reported sedation  Attempt to develop insight  Psycho-education conducted. Supportive Therapy conducted.   Probable discharge is undetermined at this time  Follow-up daily while on inpatient unit

## 2021-09-21 NOTE — CARE COORDINATION
At the request of pt and after signing KODY SHAYAN faxed a letter to the [facility]. SHAYAN also placed call in regards to pt returning, SW left name and phone number asking for a return call.

## 2021-09-22 PROCEDURE — 99232 SBSQ HOSP IP/OBS MODERATE 35: CPT | Performed by: PSYCHIATRY & NEUROLOGY

## 2021-09-22 PROCEDURE — 90833 PSYTX W PT W E/M 30 MIN: CPT | Performed by: PSYCHIATRY & NEUROLOGY

## 2021-09-22 PROCEDURE — 6370000000 HC RX 637 (ALT 250 FOR IP): Performed by: NURSE PRACTITIONER

## 2021-09-22 PROCEDURE — 1240000000 HC EMOTIONAL WELLNESS R&B

## 2021-09-22 PROCEDURE — APPSS30 APP SPLIT SHARED TIME 16-30 MINUTES: Performed by: NURSE PRACTITIONER

## 2021-09-22 PROCEDURE — 6370000000 HC RX 637 (ALT 250 FOR IP): Performed by: PSYCHIATRY & NEUROLOGY

## 2021-09-22 RX ORDER — HYDROXYZINE 50 MG/1
50 TABLET, FILM COATED ORAL 3 TIMES DAILY PRN
Qty: 60 TABLET | Refills: 0 | Status: SHIPPED | OUTPATIENT
Start: 2021-09-22 | End: 2021-10-02

## 2021-09-22 RX ORDER — PALIPERIDONE 6 MG/1
6 TABLET, EXTENDED RELEASE ORAL NIGHTLY
Qty: 5 TABLET | Refills: 0 | Status: SHIPPED | OUTPATIENT
Start: 2021-09-22

## 2021-09-22 RX ORDER — TRAZODONE HYDROCHLORIDE 50 MG/1
50 TABLET ORAL NIGHTLY PRN
Qty: 30 TABLET | Refills: 0 | Status: SHIPPED | OUTPATIENT
Start: 2021-09-22

## 2021-09-22 RX ADMIN — PALIPERIDONE 6 MG: 6 TABLET, EXTENDED RELEASE ORAL at 20:54

## 2021-09-22 RX ADMIN — ACETAMINOPHEN 650 MG: 325 TABLET, FILM COATED ORAL at 12:16

## 2021-09-22 RX ADMIN — HYDROXYZINE HYDROCHLORIDE 50 MG: 50 TABLET ORAL at 20:54

## 2021-09-22 RX ADMIN — TRAZODONE HYDROCHLORIDE 50 MG: 50 TABLET ORAL at 20:54

## 2021-09-22 RX ADMIN — ACETAMINOPHEN 650 MG: 325 TABLET, FILM COATED ORAL at 03:50

## 2021-09-22 RX ADMIN — HYDROXYZINE HYDROCHLORIDE 50 MG: 50 TABLET ORAL at 13:57

## 2021-09-22 ASSESSMENT — PAIN SCALES - GENERAL
PAINLEVEL_OUTOF10: 6
PAINLEVEL_OUTOF10: 6
PAINLEVEL_OUTOF10: 0
PAINLEVEL_OUTOF10: 3

## 2021-09-22 ASSESSMENT — PAIN DESCRIPTION - LOCATION
LOCATION: ARM
LOCATION: SHOULDER

## 2021-09-22 ASSESSMENT — PAIN DESCRIPTION - ORIENTATION
ORIENTATION: RIGHT
ORIENTATION: RIGHT

## 2021-09-22 ASSESSMENT — PAIN DESCRIPTION - PAIN TYPE
TYPE: ACUTE PAIN
TYPE: ACUTE PAIN

## 2021-09-22 ASSESSMENT — PAIN DESCRIPTION - DESCRIPTORS: DESCRIPTORS: ACHING

## 2021-09-22 NOTE — GROUP NOTE
Group Therapy Note    Date: 9/22/2021    Group Start Time: 1000  Group End Time: 1055  Group Topic: Psychotherapy    JULES BHCHELLE OSBORNE    NIKO Roper, JUAN MANUEL        Group Therapy Note    Attendees: 14/22         Patient's Goal:  Open discussion on Coping Skills on Depression     Status After Intervention:  Improved    Participation Level:  Active Listener and Interactive    Participation Quality: Appropriate, Attentive and Sharing      Speech:  normal      Thought Process/Content: Logical      Affective Functioning: Congruent      Mood: euthymic      Level of consciousness:  Alert and Oriented x4      Response to Learning: Able to verbalize current knowledge/experience and Able to verbalize/acknowledge new learning      Endings: None Reported    Modes of Intervention: Education and Socialization      Discipline Responsible: /Counselor      Signature:  NIKO Roper, JUAN MANUEL

## 2021-09-22 NOTE — CARE COORDINATION
SW spoke with pt regarding not being able to return to the [place], pt took the news well and asked for phone numbers to the [facility] in [facility] and the [address] in [facility]

## 2021-09-22 NOTE — GROUP NOTE
Group Therapy Note    Date: 9/22/2021    Group Start Time: 3241  Group End Time: 5404  Group Topic: Cognitive Skills    [address], CTRS        Group Therapy Note    Attendees: 8/21      patient refused to attend aromatherapy  group at [phone] after encouragement from staff. 1:1 talk time provided as alternative to group session.            Signature:  Love Kimble, [address]

## 2021-09-22 NOTE — GROUP NOTE
Group Therapy Note    Date: 9/21/2021    Group Start Time: 2005  Group End Time: 2040  Group Topic: Wrap-Up    JULES Perez        Group Therapy Note    Attendees: 9/20           Status After Intervention:  Improved    Participation Level:  Active Listener    Participation Quality: Appropriate      Speech:  normal      Thought Process/Content: Logical      Affective Functioning: Congruent      Mood: elevated      Level of consciousness:  Alert      Response to Learning: Able to verbalize current knowledge/experience      Endings: None Reported    Modes of Intervention: Support and Socialization      Discipline Responsible: Behavorial Health Tech      Signature:  Gaetano Ingram

## 2021-09-22 NOTE — PROGRESS NOTES
Daily Progress Note  9/22/2021    Patient Name: Tal Cardozo    CHIEF COMPLAINT: Acute psychosis         SUBJECTIVE:    Patient is seen today for a follow up assessment in the day area. She is compliant with scheduled Invega and has not received emergency medications during this admission. She had just been attending spiritual group and was pleasant and engaged in conversation with writer. Patient continues to have minimal insight into her mental illness and minimizes the events which led to her admission. 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 She is discharge focused and she feels that she is ready to leave and will be returning to a women's shelter. She reports being in a good mood overall today. She is denying suicidal and homicidal ideation and reports that she is not experiencing any auditory or visual hallucinations. She denies ever experiencing command type hallucinations telling her to harm herself or any derogatory or negative voices previously. She maintains an adequate appetite and reports feeling hungry recently. She endorses restful sleep most nights, however she endorses trouble sleeping last night due to pain at the injection site of [place]. Other than this she is denying any side effects of medications. She attends groups with regularity and reports showering recently and attending to ADLs. Patient reports she would like to follow-up with family resource center and family after discharge. She feels confident that she will maintain medication adherence when back in the community.     Appetite: Increasing    Sleep:       [x] Normal/Adequate/Unchanged  [] Fair  [] Poor      Group Attendance on Unit:   [] Yes  [x] Selectively    [] No    Medication Side Effects: Denies         Mental Status Exam  Level of consciousness: Alert and awake. Appearance: Appropriate attire for setting, seated on chair, with fair  grooming and hygiene  Behavior/Motor: Approachable, no psychomotor abnormalities. Attitude toward examiner: Cooperative, attentive, good eye contact. Speech: Normal rate, normal volume, normal tone. Mood: Euthymic  Affect: Mood congruent  Thought processes: Linear and coherent. Thought content: Denies homicidal ideation  Suicidal Ideation: Denies suicidal ideations  Delusions: Evident; believes that spirits talk through her Reports improvement in paranoia. Perceptual Disturbance: Patient does not appear to be responding to internal stimuli. Denies auditory hallucinations. Denies visual hallucinations. Cognition: Oriented to self, location, time, and situation. Memory: Intact. Insight & Judgement: Poor. Data   oral temperature is 97.5 °F (36.4 °C). Her blood pressure is 92/55 (abnormal) and her pulse is 78. Her respiration is 14.    Labs:   Admission on 09/18/2021   Component Date Value Ref Range Status    WBC 09/20/2021 8.6  3.5 - 11.0 k/uL Final    RBC 09/20/2021 4.40  4.0 - 5.2 m/uL Final    Hemoglobin 09/20/2021 13.2  12.0 - 16.0 g/dL Final    Hematocrit 09/20/2021 39.9  36 - 46 % Final    MCV 09/20/2021 90.7  80 - 100 fL Final    MCH 09/20/2021 29.9  26 - 34 pg Final    MCHC 09/20/2021 33.0  31 - 37 g/dL Final    RDW 09/20/2021 13.7  11.5 - 14.9 % Final    Platelets 16/79/0784 229  150 - 450 k/uL Final    MPV 09/20/2021 8.2  6.0 - 12.0 fL Final    NRBC Automated 09/20/2021 NOT REPORTED  per 100 WBC Final    Differential Type 09/20/2021 NOT REPORTED   Final    Seg Neutrophils 09/20/2021 74* 36 - 66 % Final    Lymphocytes 09/20/2021 19* 24 - 44 % Final    Monocytes 09/20/2021 6  1 - 7 % Final    Eosinophils % 09/20/2021 0  0 - 4 % Final    Basophils 09/20/2021 1  0 - 2 % Final    Immature Granulocytes 09/20/2021 NOT REPORTED  0 % Final    Segs Absolute 09/20/2021 6.30  1.3 - 9.1 k/uL Final    Absolute Lymph # 09/20/2021 1.70  1.0 - 4.8 k/uL Final    Absolute Mono # 09/20/2021 0.50  0.1 - 1.3 k/uL Final    Absolute Eos # 09/20/2021 0.00  0.0 - 0.4 k/uL Final    Basophils Absolute 09/20/2021 0.10  0.0 - 0.2 k/uL Final    Absolute Immature Granulocyte 09/20/2021 NOT REPORTED  0.00 - 0.30 k/uL Final    WBC Morphology 09/20/2021 NOT REPORTED   Final    RBC Morphology 09/20/2021 NOT REPORTED   Final    Platelet Estimate 06/66/0036 NOT REPORTED   Final    Glucose 09/20/2021 104* 70 - 99 mg/dL Final    BUN 09/20/2021 14  6 - 20 mg/dL Final    CREATININE 09/20/2021 0.81  0.50 - 0.90 mg/dL Final    Bun/Cre Ratio 09/20/2021 NOT REPORTED  9 - 20 Final    Calcium 09/20/2021 9.7  8.6 - 10.4 mg/dL Final    Sodium 09/20/2021 137  135 - 144 mmol/L Final    Potassium 09/20/2021 4.0  3.7 - 5.3 mmol/L Final    Chloride 09/20/2021 101  98 - 107 mmol/L Final    CO2 09/20/2021 25  20 - 31 mmol/L Final    Anion Gap 09/20/2021 11  9 - 17 mmol/L Final    Alkaline Phosphatase 09/20/2021 74  35 - 104 U/L Final    ALT 09/20/2021 11  5 - 33 U/L Final    AST 09/20/2021 22  <32 U/L Final    Total Bilirubin 09/20/2021 0.47  0.3 - 1.2 mg/dL Final    Total Protein 09/20/2021 7.9  6.4 - 8.3 g/dL Final    Albumin 09/20/2021 4.4  3.5 - 5.2 g/dL Final    Albumin/Globulin Ratio 09/20/2021 NOT REPORTED  1.0 - 2.5 Final    GFR Non- 09/20/2021 >60  >60 mL/min Final    GFR  09/20/2021 >60  >60 mL/min Final    GFR Comment 09/20/2021        Final    Comment: Average GFR for 20-28 years old:   80 mL/min/1.73sq m  Chronic Kidney Disease:   <60 mL/min/1.73sq m  Kidney failure:   <15 mL/min/1.73sq m              eGFR calculated using average adult body mass.  Additional eGFR calculator available at:        [employer]            GFR Staging 09/20/2021 NOT REPORTED   Final         Reviewed patient's current plan of care and vital signs with nursing staff. No labs have been obtained on this patient despite orders placed. Patient may have refused lab work but this has not been documented anywhere. Medications  Current Facility-Administered Medications: paliperidone (INVEGA) extended release tablet 6 mg, 6 mg, Oral, Nightly  acetaminophen (TYLENOL) tablet 650 mg, 650 mg, Oral, Q4H PRN  aluminum & magnesium hydroxide-simethicone (MAALOX) 200-200-20 MG/5ML suspension 30 mL, 30 mL, Oral, Q6H PRN  hydrOXYzine (ATARAX) tablet 50 mg, 50 mg, Oral, TID PRN  ibuprofen (ADVIL;MOTRIN) tablet 400 mg, 400 mg, Oral, Q6H PRN  polyethylene glycol (GLYCOLAX) packet 17 g, 17 g, Oral, Daily PRN  traZODone (DESYREL) tablet 50 mg, 50 mg, Oral, Nightly PRN  haloperidol lactate (HALDOL) injection 5 mg, 5 mg, IntraMUSCular, Q6H PRN **AND** LORazepam (ATIVAN) injection 2 mg, 2 mg, IntraMUSCular, Q6H PRN **AND** diphenhydrAMINE (BENADRYL) injection 50 mg, 50 mg, IntraMUSCular, Q6H PRN  haloperidol (HALDOL) tablet 5 mg, 5 mg, Oral, Q6H PRN **AND** LORazepam (ATIVAN) tablet 2 mg, 2 mg, Oral, Q6H PRN  benzocaine-menthol (CEPACOL SORE THROAT) lozenge 1 lozenge, 1 lozenge, Oral, Q2H PRN    ASSESSMENT  Acute psychosis (HCC)         PLAN  Patient symptoms are: Improving  Continue current medication regimen  Invega Sustenna injection received 9/21/2021  Monitor need and frequency of PRN medications. Encourage participation in groups and milieu. Attempt to develop insight. Psycho-education conducted. Supportive Therapy conducted. Probable discharge is to be determined by MD.   Follow-up daily while inpatient. Patient continues to be monitored in the inpatient psychiatric facility at [facility] for safety and stabilization. Patient continues to need, on a daily basis, active treatment furnished directly by or requiring the supervision of inpatient psychiatric personnel.     Electronically signed by REYNALDO Shearer Cha, CNP on 9/22/2021 at 3:29 PM    **This report has been created using voice recognition software. It may contain minor errors which are inherent in voice recognition technology. **    I independently saw and evaluated the patient. I reviewed the nurse practitioners documentation above. Any additional comments or changes to the nurse practitioners documentation are stated below otherwise agree with assessment. Plan will be as follows:  Spent 30 minutes with the patient, of that greater than 16 minutes was spent in supportive psychotherapy. Patient reporting improvement in her symptoms. Denying auditory or visual hallucinations. Reports improvement in paranoia. Tolerating long-acting injectable well. Denying safety concerns. Discussed if stable through tomorrow would consider discharge and patient was in agreement  PLAN  Patient s symptoms   are improving  Continue with current medication for now  Attempt to develop insight  Psycho-education conducted. Supportive Therapy conducted.   Probable discharge is tomorrow if stable or improved  Follow-up daily while on inpatient unit

## 2021-09-22 NOTE — CARE COORDINATION
SW received message from Diana Garcia at the [facility] reporting that they cannot take the pt back into housing. Diana Garcia reports they do not feel safe bringing the pt back after the episode that pt had on Saturday. Diana Garcia reports pt is welcome to use the other services the [facility] offers, they are just unable to offer housing. Pt can continue to work Isaiah Mess the housing advocate to find housing.

## 2021-09-22 NOTE — CARE COORDINATION
SW spoke with pt regarding the phone call with Coni Choi. SW explained advocating on behalf of the pt and now just waiting for the team at the shelter to meet and call SW back with an answer. Pt expressed fear of not being able to return and not having a place to go. SW discussed possibly going to the [facility] here in [place] if needed. Pt agreed to go if this was the last resort.

## 2021-09-22 NOTE — PLAN OF CARE
Problem: Altered Mood, Deterioration in Function:  Goal: Able to verbalize reality based thinking  Description: Able to verbalize reality based thinking  Outcome: Ongoing     Patient is pleasant and cooperative, accepting of long acting injection this evening, compliant with all medications. Pt denies thoughts of self harm and is agreeable to seeking out should thoughts of self harm arise. Safe environment maintained. Q15 minute checks for safety cont per unit policy. Will cont to monitor for safety and provide support and reassurance as needed.       Problem: Altered Mood, Manic Behavior:  Goal: Able to verbalize decrease in frequency and intensity of racing thoughts  Description: Able to verbalize decrease in frequency and intensity of racing thoughts  Outcome: Ongoing     Problem: Altered Mood, Manic Behavior:  Goal: Absence of self-harm  Description: Absence of self-harm  Outcome: Ongoing

## 2021-09-22 NOTE — GROUP NOTE
Group Therapy Note    Date: 9/22/2021    Group Start Time: 1100  Group End Time: 1150  Group Topic: Recreational    [address], CTRS        Group Therapy Note    Attendees:10/22      patient refused to attend  self esteem group at [phone] after encouragement from staff. 1:1 talk time provided as alternative to group session.         Signature:  Lucero Jimenes, [address]

## 2021-09-22 NOTE — CARE COORDINATION
SW spoke with Mai Almanzar at [facility] regarding pt returning to the shelter. SW advocated on behalf of pt explaining pts willingness to engage in treatment and that pt has made the choice to take a long acting injectable. Mai Almanzar will meet with the team and call the SW back with an answer today or tomorrow regarding pts status.

## 2021-09-22 NOTE — PLAN OF CARE
Problem: Altered Mood, Deterioration in Function:  Goal: Able to verbalize reality based thinking  Description: Able to verbalize reality based thinking  9/22/2021 0619 by Tamara Albright RN  Outcome: Ongoing     Problem: Altered Mood, Manic Behavior:  Goal: Able to verbalize decrease in frequency and intensity of racing thoughts  Description: Able to verbalize decrease in frequency and intensity of racing thoughts  9/22/2021 0619 by Tamara Albright RN  Outcome: Ongoing  Goal: Absence of self-harm  Description: Absence of self-harm  9/22/2021 7204 by Tamara Albright RN  Outcome: Ongoing  Patient has been more social today and out in day room. Patient has also attended some groups. Patient endorses continued anxiety and depression, but states, \"I feel better and more clear. \" Denies suicidal ideation or thoughts of self-harm. Agrees to notify staff if such thoughts arise. Q15 minute safety checks in place and will continue.

## 2021-09-23 VITALS
TEMPERATURE: 97.7 F | HEART RATE: 110 BPM | SYSTOLIC BLOOD PRESSURE: 128 MMHG | DIASTOLIC BLOOD PRESSURE: 77 MMHG | RESPIRATION RATE: 14 BRPM

## 2021-09-23 PROCEDURE — 6370000000 HC RX 637 (ALT 250 FOR IP): Performed by: NURSE PRACTITIONER

## 2021-09-23 PROCEDURE — 99239 HOSP IP/OBS DSCHRG MGMT >30: CPT | Performed by: PSYCHIATRY & NEUROLOGY

## 2021-09-23 RX ADMIN — HYDROXYZINE HYDROCHLORIDE 50 MG: 50 TABLET ORAL at 10:14

## 2021-09-23 NOTE — PROGRESS NOTES
CLINICAL PHARMACY NOTE: MEDS TO BEDS    Total # of Prescriptions Filled: 3   The following medications were delivered to the patient:  · Paliperidone ER 6mg  · Hydroxyzine HCL 50mg  · Trazodone HCL 50mg    Additional Documentation:  Delivered Medication to [facility]

## 2021-09-23 NOTE — CARE COORDINATION
Name: Mike Whiting    : 1990    Discharge Date: 21    Primary Auth/Cert #: [id number]     Destination: [facility]     Discharge Medications:      Medication List      START taking these medications    hydrOXYzine 50 MG tablet  Commonly known as: ATARAX  Take 1 tablet by mouth 3 times daily as needed for Anxiety  Notes to patient: To help with anxiety     paliperidone 6 MG extended release tablet  Commonly known as: INVEGA  Take 1 tablet by mouth nightly  Notes to patient: To help stabilize mood     paliperidone palmitate  MG/ML Shelbie IM injection  Commonly known as: Virgilio Leisure Sustenna  Inject 156 mg into the muscle every 30 days  Start taking on: 2021  Notes to patient: To help stabilize mood     traZODone 50 MG tablet  Commonly known as: DESYREL  Take 1 tablet by mouth nightly as needed for Sleep        STOP taking these medications    lurasidone 40 MG Tabs tablet  Commonly known as: LATUDA     OXcarbazepine 300 MG tablet  Commonly known as: TRILEPTAL     prazosin 1 MG capsule  Commonly known as: MINIPRESS           Where to Get Your Medications      These medications were sent to [facility], [address] [facility],[address]  [address] [place] [address], [place] [address]    Phone: [phone]   · paliperidone palmitate  MG/ML Shelbie IM injection     These medications were sent to [address], [address] [facility] [address]    Phone: [phone]   · hydrOXYzine 50 MG tablet  · paliperidone 6 MG extended release tablet  · traZODone 50 MG tablet         Follow Up Appointment: [facility]  [address]   [place] [id number] [phone]    On 10/5/2021  Pt has appointment at 9:30 am

## 2021-09-23 NOTE — DISCHARGE SUMMARY
Provider Discharge Summary     Patient ID:  Alireza Arciniega  [id number]  34 y.o.  1990    Admit date: 9/18/2021    Discharge date and time: 9/23/2021  4:52 PM     Admitting Physician: Annette Bagley MD     Discharge Physician: Sarahy Helms MD    Admission Diagnoses: Acute psychosis [facility]) [F23]    Discharge Diagnoses:      Acute psychosis [facility])     Patient Active Problem List   Diagnosis Code    PTSD (post-traumatic stress disorder) F43.10    Acute psychosis ([address]        Admission Condition: poor    Discharged Condition: stable    Indication for Admission: threat to self    History of Present Illnes (present tense wording is of findings from admission exam and are not necessarily indicative of current findings): Alireza Arciniega is a 35 y.o. female who has a past medical history of mental illness who presented to the [facility] ER by [employer] for barricading herself in her room at her shelter.      Per ED records from [facility] patient was placed on a pink slip which stated, \" patient is at imminent risk of harm to self/others as evidenced by paranoid delusions, auditory and visual hallucinations, pacing, yelling, arguing with voices. \"  Patient was noted to be barricading herself in her room at the [facility] and reportedly had a taser with her. Per ED records patient was paranoid and noted to be arguing with voices she was barricaded in her room. Patient did receive IM injections for agitation in the ER.     Upon assessment today patient is lying in bed and is somnolent but responds to verbal stimuli. Patient reports \"this is a misunderstanding, I do not think I should be here. 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 \"  She reports that yesterday she was \"with my clients on the phone and they must of been talking loudly and they got concerned for my safety. \"  She reports that she was doing her job yesterday and minding her own business in her room. Patient states \"I was on the clock. \"  Patient is very tangential in her answers. She presents as disorganized and hyperverbal.  She is experiencing flight of ideas. Patient reports that she has not slept for the past \"2 days. \"  She states that her energy level has been increased.     Patient denies of symptoms of depression including poor concentration, low mood, low energy, poor appetite or decreased interest in things. Patient does report that she has not been sleeping for the past 2 days, she has grandiose thoughts of herself believing that she is a psychic medium, and has been experiencing psychosis per ED records. Patient does endorse excess worry, restlessness, and muscle tension from being anxious all the time. She denies panic attacks. She denies obsessive-compulsive behaviors or thoughts. She denies past history of abuse. Patient does report that she struggles with nightmares but is unable to give details whether or not these are nightmares of things that happened during her past.     Patient reports that she has not been compliant with medications and does not take any medications currently. She states \"I was here maybe a couple years ago and they started me on some medications but I do not take them. \"  Patient denies illicit drug use. Patient states \"I drink socially\" but does not report a significant problem with alcohol use.       Hospital Course:   Upon admission, Nighat Godoy was provided a safe secure environment, introduced to unit milieu. Patient participated in groups and individual therapies. Meds were adjusted as noted below. After few days of hospital care, patient began to feel improvement. Depression lifted, thoughts to harm self ceased. Sleep improved, appetite was good. On morning rounds 9/23/2021, Nighat Godoy  endorses feeling ready for discharge. Patient denies suicidal or homicidal ideations, denies hallucinations or delusions. Denies SE's from meds. It was decided that maximum benefit from hospital care had been achieved and patient can be discharged. Consults:   No consults    Significant Diagnostic Studies: Routine labs and diagnostics    Treatments: Psychotropic medications, therapy with group, milieu, and 1:1 with nurses, social workers, PAJOY/CNP, and Attending physician.       Discharge Medications:  Discharge Medication List as of 9/23/2021 11:09 AM      START taking these medications    Details   hydrOXYzine (ATARAX) 50 MG tablet Take 1 tablet by mouth 3 times daily as needed for Anxiety, Disp-60 tablet, R-0Normal      paliperidone (INVEGA) 6 MG extended release tablet Take 1 tablet by mouth nightly, Disp-5 tablet, R-0Normal      paliperidone palmitate ER (INVEGA SUSTENNA) 156 MG/ML ASHLEY IM injection Inject 156 mg into the muscle every 30 days, IntraMUSCular, EVERY 30 DAYS Starting Tue 9/28/2021, Disp-1 each, R-0, Normal      traZODone (DESYREL) 50 MG tablet Take 1 tablet by mouth nightly as needed for Sleep, Disp-30 tablet, R-0Normal         STOP taking these medications       OXcarbazepine (TRILEPTAL) 300 MG tablet Comments:   Reason for Stopping:         prazosin (MINIPRESS) 1 MG capsule Comments:   Reason for Stopping:         lurasidone (LATUDA) 40 MG TABS tablet Comments:   Reason for Stopping:                Core Measures statement:   Not applicable    Discharge Exam:  Level of consciousness:  Within normal limits  Appearance: Street clothes, seated, with good grooming  Behavior/Motor: No abnormalities noted  Attitude toward examiner:  Cooperative, attentive, good eye contact  Speech:  spontaneous, normal rate, normal volume and well articulated  Mood:  euthymic  Affect:  Full range  Thought processes:  linear, goal directed and coherent  Thought content:  denies homicidal ideation  Suicidal Ideation:  denies suicidal ideation  Delusions:  no evidence of delusions  Perceptual Disturbance:  denies any perceptual disturbance  Cognition: Intact  Memory: age appropriate  Insight & Judgement: fair  Medication side effects: denies     Disposition: home    Patient Instructions: Activity: activity as tolerated  1. Patient instructed to take medications regularly and follow up with outpatient appointments. Follow-up as scheduled with outpatient [facility]      Signed:    Electronically signed by Napoleon Mendez MD on 9/23/21 at 4:52 PM EDT    Time Spent on discharge is more than 30 minutes in the examination, evaluation, counseling and review of medications and discharge plan.

## 2021-09-23 NOTE — GROUP NOTE
Group Therapy Note    Date: 9/23/2021    Group Start Time: 1100  Group End Time: 1150  Group Topic: Cognitive Skills    JULES Torrez, CTRS        Group Therapy Note    Attendees: 10/22    patient refused to attend coping skills jeopardy group at [phone] after encouragement from staff. 1:1 talk time provided as alternative to group session.        Signature:  Belinda Torrez, [address]

## 2021-09-23 NOTE — PLAN OF CARE
Problem: Altered Mood, Deterioration in Function:  Goal: Able to verbalize reality based thinking  Description: Able to verbalize reality based thinking  Outcome: Ongoing     Problem: Altered Mood, Manic Behavior:  Goal: Absence of self-harm  Description: Absence of self-harm  Outcome: Ongoing      Pt denies suicidal ideations at this time. Pt agreed to seek staff at anytime she felt like any urges to harm self would arise. Safety checks maintained [id number]. Pt remains free from self harm this shift. Pt denies wanting to cause harm to self or others at this time. Pt encouraged to seek nursing staff at anytime if she felt at danger to themselves or others. Pt states understanding. Safety checks maintained [id number]    Patient is complaining of anxiety at this time. Stating that they feel restless and are having trouble sleeping and calming down in order to rest this evening. Medication was given as prescribed for increased anxiety see MAR.     Pt out for short periods, denies SI/HI

## 2021-09-23 NOTE — CARE COORDINATION
Discharge Arrangements: Has follow-up appointment with Nolan Yip on Oct 5th at 80 am    Guardian notified:  NA     Discharge destination/address: This is a DOUBLE stop please make sure cab is aware.  [facility] will not bring pts personal belongings to the pt: [facility]uz[facility], [address] to  personal belongings, then pt is going to [address] [facility], [place], [address]    Transported by:  Please make sure cab is aware that pt needs to  belongings in [facility] and will return to [facility] here in [place]

## 2021-09-23 NOTE — SUICIDE SAFETY PLAN
SAFETY PLAN    A suicide Safety Plan is a document that supports someone when they are having thoughts of suicide. Warning Signs that indicate a suicidal crisis may be developing: What (situations, thoughts, feelings, body sensations, behaviors, etc.) do you experience that lets you know you are beginning to think about suicide? 1. Too synchronistic  2. Paranoia  3. Depression    Internal Coping Strategies:  What things can I do (relaxation techniques, hobbies, physical activities, etc.) to take my mind off my problems without contacting another person? 1. Coloring  2. Reading  3. Walking    People and social settings that provide distraction: Who can I call or where can I go to distract me? 1. Name: [employer] Phone:   2. Name:   Phone:    3. Place: The park            4. Place: Coffee shop    People whom I can ask for help: Who can I call when I need help - for example, friends, family, clergy, someone else? 1. Name: [employer]             Phone:   2. Name: Counselor Phone:   3. Name: Crisis Hotlines   Phone:     Professionals or [address] [place] [address] I can contact during a crisis: Who can I call for help - for example, my doctor, my psychiatrist, my psychologist, a mental health provider, a suicide hotline? 1. Clinician Name: Jennifer   Phone: MAYE Felix 16 Pager or Emergency Contact #:     2. Clinician Name: Miguel Reilly   Phone:       Clinician Pager or Emergency Contact #:     3. Suicide Prevention Lifeline: 4-674-559-TALK (4948)    4. [address] [place] [address] Emergency Services -  for example, [facility] suicide hotline, [facility] Hotline: [facility]       Emergency Services Address: [address] [place] [address], [address] [place] [address]      Emergency Services Phone: [phone]    Making the environment safe: How can I make my environment (house/apartment/living space) safer? For example, can I remove guns, medications, and other items?   1. Remove any dangerous items  2.  Call someone if I feel like hurting myself